# Patient Record
Sex: MALE | Race: WHITE | NOT HISPANIC OR LATINO | Employment: OTHER | URBAN - METROPOLITAN AREA
[De-identification: names, ages, dates, MRNs, and addresses within clinical notes are randomized per-mention and may not be internally consistent; named-entity substitution may affect disease eponyms.]

---

## 2023-03-25 DIAGNOSIS — E78.5 HYPERLIPIDEMIA, UNSPECIFIED: ICD-10-CM

## 2023-03-25 DIAGNOSIS — I10 ESSENTIAL (PRIMARY) HYPERTENSION: ICD-10-CM

## 2023-03-26 DIAGNOSIS — R60.0 LOCALIZED EDEMA: ICD-10-CM

## 2023-03-27 RX ORDER — HYDROCHLOROTHIAZIDE 12.5 MG/1
TABLET ORAL
Qty: 90 TABLET | Refills: 1 | Status: SHIPPED | OUTPATIENT
Start: 2023-03-27

## 2023-03-27 RX ORDER — ROSUVASTATIN CALCIUM 10 MG/1
TABLET, COATED ORAL
Qty: 90 TABLET | Refills: 1 | Status: SHIPPED | OUTPATIENT
Start: 2023-03-27

## 2023-03-27 RX ORDER — FUROSEMIDE 20 MG/1
TABLET ORAL
Qty: 45 TABLET | Refills: 0 | Status: SHIPPED | OUTPATIENT
Start: 2023-03-27 | End: 2023-06-27

## 2023-04-06 ENCOUNTER — TELEPHONE (OUTPATIENT)
Dept: PRIMARY CARE | Facility: CLINIC | Age: 78
End: 2023-04-06
Payer: MEDICARE

## 2023-04-07 DIAGNOSIS — I10 HYPERTENSION, UNSPECIFIED TYPE: Primary | ICD-10-CM

## 2023-04-07 RX ORDER — LOSARTAN POTASSIUM 100 MG/1
100 TABLET ORAL DAILY
Qty: 90 TABLET | Refills: 0 | Status: SHIPPED | OUTPATIENT
Start: 2023-04-07 | End: 2023-07-03

## 2023-04-07 RX ORDER — LOSARTAN POTASSIUM 100 MG/1
100 TABLET ORAL DAILY
COMMUNITY
End: 2023-04-07 | Stop reason: SDUPTHER

## 2023-04-08 DIAGNOSIS — E03.9 HYPOTHYROIDISM, UNSPECIFIED: ICD-10-CM

## 2023-04-11 RX ORDER — LEVOTHYROXINE SODIUM 137 UG/1
TABLET ORAL
Qty: 90 TABLET | Refills: 3 | Status: SHIPPED | OUTPATIENT
Start: 2023-04-11

## 2023-05-09 DIAGNOSIS — E87.6 HYPOKALEMIA: ICD-10-CM

## 2023-05-09 RX ORDER — POTASSIUM CHLORIDE 750 MG/1
TABLET, EXTENDED RELEASE ORAL
Qty: 90 TABLET | Refills: 1 | Status: SHIPPED | OUTPATIENT
Start: 2023-05-09

## 2023-05-13 DIAGNOSIS — Z00.00 ENCOUNTER FOR GENERAL ADULT MEDICAL EXAMINATION WITHOUT ABNORMAL FINDINGS: ICD-10-CM

## 2023-05-16 RX ORDER — EMPAGLIFLOZIN 10 MG/1
TABLET, FILM COATED ORAL
Qty: 30 TABLET | Refills: 3 | Status: SHIPPED | OUTPATIENT
Start: 2023-05-16

## 2023-05-30 LAB — PROSTATE SPECIFIC AG (NG/ML) IN SER/PLAS: 10.58 NG/ML (ref 0–4)

## 2023-06-14 ENCOUNTER — NURSING HOME VISIT (OUTPATIENT)
Dept: POST ACUTE CARE | Facility: EXTERNAL LOCATION | Age: 78
End: 2023-06-14
Payer: MEDICARE

## 2023-06-14 DIAGNOSIS — N18.31 STAGE 3A CHRONIC KIDNEY DISEASE (MULTI): ICD-10-CM

## 2023-06-14 DIAGNOSIS — I48.19 PERSISTENT ATRIAL FIBRILLATION (MULTI): Primary | ICD-10-CM

## 2023-06-14 DIAGNOSIS — J96.00 SEPSIS DUE TO STREPTOCOCCUS SPECIES WITH ACUTE RESPIRATORY FAILURE, UNSPECIFIED WHETHER HYPOXIA OR HYPERCAPNIA PRESENT, UNSPECIFIED WHETHER SEPTIC SHOCK PRESENT (MULTI): ICD-10-CM
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
## 2023-06-14 DIAGNOSIS — R65.20 SEPSIS DUE TO STREPTOCOCCUS SPECIES WITH ACUTE RESPIRATORY FAILURE, UNSPECIFIED WHETHER HYPOXIA OR HYPERCAPNIA PRESENT, UNSPECIFIED WHETHER SEPTIC SHOCK PRESENT (MULTI): ICD-10-CM

## 2023-06-14 DIAGNOSIS — R78.81 BACTEREMIA: ICD-10-CM

## 2023-06-14 DIAGNOSIS — I95.89 HYPOTENSION DUE TO HYPOVOLEMIA: ICD-10-CM

## 2023-06-14 DIAGNOSIS — A40.9 SEPSIS DUE TO STREPTOCOCCUS SPECIES WITH ACUTE RESPIRATORY FAILURE, UNSPECIFIED WHETHER HYPOXIA OR HYPERCAPNIA PRESENT, UNSPECIFIED WHETHER SEPTIC SHOCK PRESENT (MULTI): ICD-10-CM

## 2023-06-14 DIAGNOSIS — E86.1 HYPOTENSION DUE TO HYPOVOLEMIA: ICD-10-CM

## 2023-06-14 DIAGNOSIS — K92.1 GASTROINTESTINAL HEMORRHAGE WITH MELENA: ICD-10-CM

## 2023-06-14 DIAGNOSIS — I33.0 SUBACUTE BACTERIAL ENDOCARDITIS (HHS-HCC): ICD-10-CM

## 2023-06-14 PROCEDURE — 99306 1ST NF CARE HIGH MDM 50: CPT | Performed by: STUDENT IN AN ORGANIZED HEALTH CARE EDUCATION/TRAINING PROGRAM

## 2023-06-14 ASSESSMENT — ENCOUNTER SYMPTOMS
NEUROLOGICAL NEGATIVE: 1
GASTROINTESTINAL NEGATIVE: 1
PSYCHIATRIC NEGATIVE: 1
FATIGUE: 1
MUSCULOSKELETAL NEGATIVE: 1
APPETITE CHANGE: 1
CARDIOVASCULAR NEGATIVE: 1
SHORTNESS OF BREATH: 1

## 2023-06-14 NOTE — LETTER
Patient: Dustin Draper  : 1945    Encounter Date: 2023    Subjective  Patient ID: Dustin Draper Jr. is a 78 y.o. male.    Pt is a 78 year old amle with afib, HTN, CHF, CKD who presented to the [facility] ED with weakness and falls. Pt was fund to be bacteremic with group C strep and suspected endocarditis. Pt was seen by ID, no infectious source or vegetations were found however ID did recommend 4 weeks of antibiotics. Pt was then recommended SNF of discharge. ON evaluation pt reports no complaints however is weak. Regards no acute complaints at this time and feels ok. States no additional issues.         Review of Systems   Constitutional:  Positive for appetite change and fatigue.   HENT: Negative.     Respiratory:  Positive for shortness of breath.    Cardiovascular: Negative.    Gastrointestinal: Negative.    Musculoskeletal: Negative.    Skin: Negative.    Neurological: Negative.    Psychiatric/Behavioral: Negative.         ObjectiveVitals Reviewed via facility EMR   Physical Exam  Vitals and nursing note reviewed.   Constitutional:       General: He is not in acute distress.     Appearance: He is ill-appearing.      Comments: Elderly, lethargic   HENT:      Mouth/Throat:      Mouth: Mucous membranes are moist.      Pharynx: Oropharynx is clear. No oropharyngeal exudate.   Eyes:      Extraocular Movements: Extraocular movements intact.      Pupils: Pupils are equal, round, and reactive to light.   Cardiovascular:      Rate and Rhythm: Normal rate and regular rhythm.      Pulses: Normal pulses.      Heart sounds: Normal heart sounds. No murmur heard.     Comments: 3/6 systolic murmur  Pulmonary:      Effort: Pulmonary effort is normal. No respiratory distress.   Abdominal:      General: Abdomen is flat. Bowel sounds are normal.   Musculoskeletal:         General: Normal range of motion.      Right lower leg: No edema.      Left lower leg: No edema.   Skin:     General: Skin is warm and dry.       Capillary Refill: Capillary refill takes less than 2 seconds.   Neurological:      General: No focal deficit present.      Mental Status: He is alert. Mental status is at baseline.      Cranial Nerves: No cranial nerve deficit.      Motor: No weakness.   Psychiatric:         Mood and Affect: Mood normal.         Thought Content: Thought content normal.         Assessment/Plan  Diagnoses and all orders for this visit:  Persistent atrial fibrillation (CMS/HCC)  Stage 3a chronic kidney disease  Hypotension due to hypovolemia  Subacute bacterial endocarditis  Gastrointestinal hemorrhage with melena  Sepsis due to Streptococcus species with acute respiratory failure, unspecified whether hypoxia or hypercapnia present, unspecified whether septic shock present (CMS/HCC)  Fall, initial encounter  Bacteremia    PT seen and examined, hospital course reviewed, will repeat blood cultures and obtain labs. Check chest x-ray, attempt trial void. Pending clinical course may discuss palliative care with family, continue supportive care, PT/OT, weekly labs    >45 minutes spent in management of pt      Electronically Signed By: Ramesh Harmon DO   6/14/23  9:36 PM

## 2023-06-15 NOTE — PROGRESS NOTES
Subjective   Patient ID: Dustin Draper Jr. is a 78 y.o. male.    Pt is a 78 year old amle with afib, HTN, CHF, CKD who presented to the [facility] ED with weakness and falls. Pt was fund to be bacteremic with group C strep and suspected endocarditis. Pt was seen by ID, no infectious source or vegetations were found however ID did recommend 4 weeks of antibiotics. Pt was then recommended SNF of discharge. ON evaluation pt reports no complaints however is weak. Regards no acute complaints at this time and feels ok. States no additional issues.         Review of Systems   Constitutional:  Positive for appetite change and fatigue.   HENT: Negative.     Respiratory:  Positive for shortness of breath.    Cardiovascular: Negative.    Gastrointestinal: Negative.    Musculoskeletal: Negative.    Skin: Negative.    Neurological: Negative.    Psychiatric/Behavioral: Negative.         Objective Vitals Reviewed via facility EMR   Physical Exam  Vitals and nursing note reviewed.   Constitutional:       General: He is not in acute distress.     Appearance: He is ill-appearing.      Comments: Elderly, lethargic   HENT:      Mouth/Throat:      Mouth: Mucous membranes are moist.      Pharynx: Oropharynx is clear. No oropharyngeal exudate.   Eyes:      Extraocular Movements: Extraocular movements intact.      Pupils: Pupils are equal, round, and reactive to light.   Cardiovascular:      Rate and Rhythm: Normal rate and regular rhythm.      Pulses: Normal pulses.      Heart sounds: Normal heart sounds. No murmur heard.     Comments: 3/6 systolic murmur  Pulmonary:      Effort: Pulmonary effort is normal. No respiratory distress.   Abdominal:      General: Abdomen is flat. Bowel sounds are normal.   Musculoskeletal:         General: Normal range of motion.      Right lower leg: No edema.      Left lower leg: No edema.   Skin:     General: Skin is warm and dry.      Capillary Refill: Capillary refill takes less than 2 seconds.    Neurological:      General: No focal deficit present.      Mental Status: He is alert. Mental status is at baseline.      Cranial Nerves: No cranial nerve deficit.      Motor: No weakness.   Psychiatric:         Mood and Affect: Mood normal.         Thought Content: Thought content normal.         Assessment/Plan   Diagnoses and all orders for this visit:  Persistent atrial fibrillation (CMS/HCC)  Stage 3a chronic kidney disease  Hypotension due to hypovolemia  Subacute bacterial endocarditis  Gastrointestinal hemorrhage with melena  Sepsis due to Streptococcus species with acute respiratory failure, unspecified whether hypoxia or hypercapnia present, unspecified whether septic shock present (CMS/[facility])  Fall, initial encounter  Bacteremia    PT seen and examined, hospital course reviewed, will repeat blood cultures and obtain labs. Check chest x-ray, attempt trial void. Pending clinical course may discuss palliative care with family, continue supportive care, PT/OT, weekly labs    >45 minutes spent in management of pt

## 2023-06-16 ENCOUNTER — NURSING HOME VISIT (OUTPATIENT)
Dept: POST ACUTE CARE | Facility: EXTERNAL LOCATION | Age: 78
End: 2023-06-16
Payer: MEDICARE

## 2023-06-16 DIAGNOSIS — E86.1 HYPOTENSION DUE TO HYPOVOLEMIA: Primary | ICD-10-CM

## 2023-06-16 DIAGNOSIS — I33.0 SUBACUTE BACTERIAL ENDOCARDITIS (HHS-HCC): ICD-10-CM

## 2023-06-16 DIAGNOSIS — I48.19 PERSISTENT ATRIAL FIBRILLATION (MULTI): ICD-10-CM

## 2023-06-16 DIAGNOSIS — R78.81 BACTEREMIA: ICD-10-CM

## 2023-06-16 DIAGNOSIS — R65.20 SEPSIS DUE TO STREPTOCOCCUS SPECIES WITH ACUTE RESPIRATORY FAILURE, UNSPECIFIED WHETHER HYPOXIA OR HYPERCAPNIA PRESENT, UNSPECIFIED WHETHER SEPTIC SHOCK PRESENT (MULTI): ICD-10-CM

## 2023-06-16 DIAGNOSIS — J96.00 SEPSIS DUE TO STREPTOCOCCUS SPECIES WITH ACUTE RESPIRATORY FAILURE, UNSPECIFIED WHETHER HYPOXIA OR HYPERCAPNIA PRESENT, UNSPECIFIED WHETHER SEPTIC SHOCK PRESENT (MULTI): ICD-10-CM

## 2023-06-16 DIAGNOSIS — N18.31 STAGE 3A CHRONIC KIDNEY DISEASE (MULTI): ICD-10-CM

## 2023-06-16 DIAGNOSIS — I95.89 HYPOTENSION DUE TO HYPOVOLEMIA: Primary | ICD-10-CM

## 2023-06-16 DIAGNOSIS — A40.9 SEPSIS DUE TO STREPTOCOCCUS SPECIES WITH ACUTE RESPIRATORY FAILURE, UNSPECIFIED WHETHER HYPOXIA OR HYPERCAPNIA PRESENT, UNSPECIFIED WHETHER SEPTIC SHOCK PRESENT (MULTI): ICD-10-CM

## 2023-06-16 PROCEDURE — 99309 SBSQ NF CARE MODERATE MDM 30: CPT | Performed by: STUDENT IN AN ORGANIZED HEALTH CARE EDUCATION/TRAINING PROGRAM

## 2023-06-16 NOTE — LETTER
Patient: Dustin Draper  : 1945    Encounter Date: 2023    Subjective  Patient ID: Dustin Draper Jr. is a 78 y.o. male.      Pt seen resting in bed, reports overall doing well and some improvement but still feels weak. States his legs are swollen and have become edematous. Still having some shortness of breath. Cultures still pending, no additional issues.           Review of Systems   Constitutional: Negative.    HENT: Negative.     Respiratory: Negative.     Cardiovascular: Negative.    Gastrointestinal: Negative.    Musculoskeletal: Negative.    Skin: Negative.    Neurological: Negative.    Psychiatric/Behavioral: Negative.         ObjectiveVitals Reviewed via facility EMR   Physical Exam  Vitals and nursing note reviewed.   Constitutional:       General: He is not in acute distress.     Appearance: Normal appearance.   HENT:      Mouth/Throat:      Mouth: Mucous membranes are moist.      Pharynx: Oropharynx is clear. No oropharyngeal exudate.   Eyes:      Extraocular Movements: Extraocular movements intact.      Pupils: Pupils are equal, round, and reactive to light.   Cardiovascular:      Rate and Rhythm: Normal rate and regular rhythm.      Pulses: Normal pulses.      Heart sounds: Normal heart sounds. No murmur heard.  Pulmonary:      Effort: Pulmonary effort is normal. No respiratory distress.   Abdominal:      General: Abdomen is flat. Bowel sounds are normal. There is no distension.      Tenderness: There is no abdominal tenderness.   Musculoskeletal:         General: Normal range of motion.      Right lower leg: Edema present.      Left lower leg: Edema present.      Comments: +2 edmea bilaterally   Skin:     General: Skin is warm and dry.      Capillary Refill: Capillary refill takes less than 2 seconds.   Neurological:      General: No focal deficit present.      Mental Status: He is alert. Mental status is at baseline.      Cranial Nerves: No cranial nerve deficit.      Motor: No  weakness.   Psychiatric:         Mood and Affect: Mood normal.         Thought Content: Thought content normal.         Assessment/Plan  Diagnoses and all orders for this visit:  Hypotension due to hypovolemia  Persistent atrial fibrillation (CMS/HCC)  Subacute bacterial endocarditis  Stage 3a chronic kidney disease  Sepsis due to Streptococcus species with acute respiratory failure, unspecified whether hypoxia or hypercapnia present, unspecified whether septic shock present (CMS/HCC)  Bacteremia    Pt medically stable however still hypoxic and fluid overloaded, start increase of diurectics monitor weight, await blood cultures, continue antibiotics. Pt/Ot encourage out of bed with therapy. Bi-weekly labs, supportive care    >30 minutes spent in management of pt      Electronically Signed By: Ramesh Harmon DO   6/18/23  4:42 PM

## 2023-06-18 ASSESSMENT — ENCOUNTER SYMPTOMS
CONSTITUTIONAL NEGATIVE: 1
PSYCHIATRIC NEGATIVE: 1
RESPIRATORY NEGATIVE: 1
NEUROLOGICAL NEGATIVE: 1
MUSCULOSKELETAL NEGATIVE: 1
GASTROINTESTINAL NEGATIVE: 1
CARDIOVASCULAR NEGATIVE: 1

## 2023-06-18 NOTE — PROGRESS NOTES
Subjective   Patient ID: Dustin Draper Jr. is a 78 y.o. male.      Pt seen resting in bed, reports overall doing well and some improvement but still feels weak. States his legs are swollen and have become edematous. Still having some shortness of breath. Cultures still pending, no additional issues.           Review of Systems   Constitutional: Negative.    HENT: Negative.     Respiratory: Negative.     Cardiovascular: Negative.    Gastrointestinal: Negative.    Musculoskeletal: Negative.    Skin: Negative.    Neurological: Negative.    Psychiatric/Behavioral: Negative.         Objective Vitals Reviewed via facility EMR   Physical Exam  Vitals and nursing note reviewed.   Constitutional:       General: He is not in acute distress.     Appearance: Normal appearance.   HENT:      Mouth/Throat:      Mouth: Mucous membranes are moist.      Pharynx: Oropharynx is clear. No oropharyngeal exudate.   Eyes:      Extraocular Movements: Extraocular movements intact.      Pupils: Pupils are equal, round, and reactive to light.   Cardiovascular:      Rate and Rhythm: Normal rate and regular rhythm.      Pulses: Normal pulses.      Heart sounds: Normal heart sounds. No murmur heard.  Pulmonary:      Effort: Pulmonary effort is normal. No respiratory distress.   Abdominal:      General: Abdomen is flat. Bowel sounds are normal. There is no distension.      Tenderness: There is no abdominal tenderness.   Musculoskeletal:         General: Normal range of motion.      Right lower leg: Edema present.      Left lower leg: Edema present.      Comments: +2 edmea bilaterally   Skin:     General: Skin is warm and dry.      Capillary Refill: Capillary refill takes less than 2 seconds.   Neurological:      General: No focal deficit present.      Mental Status: He is alert. Mental status is at baseline.      Cranial Nerves: No cranial nerve deficit.      Motor: No weakness.   Psychiatric:         Mood and Affect: Mood normal.          Thought Content: Thought content normal.         Assessment/Plan   Diagnoses and all orders for this visit:  Hypotension due to hypovolemia  Persistent atrial fibrillation (CMS/HCC)  Subacute bacterial endocarditis  Stage 3a chronic kidney disease  Sepsis due to Streptococcus species with acute respiratory failure, unspecified whether hypoxia or hypercapnia present, unspecified whether septic shock present (CMS/HCC)  Bacteremia    Pt medically stable however still hypoxic and fluid overloaded, start increase of diurectics monitor weight, await blood cultures, continue antibiotics. Pt/Ot encourage out of bed with therapy. Bi-weekly labs, supportive care    >30 minutes spent in management of pt

## 2023-06-19 ENCOUNTER — APPOINTMENT (OUTPATIENT)
Dept: PRIMARY CARE | Facility: CLINIC | Age: 78
End: 2023-06-19
Payer: MEDICARE

## 2023-06-21 ENCOUNTER — NURSING HOME VISIT (OUTPATIENT)
Dept: POST ACUTE CARE | Facility: EXTERNAL LOCATION | Age: 78
End: 2023-06-21
Payer: MEDICARE

## 2023-06-21 DIAGNOSIS — J96.00 SEPSIS DUE TO STREPTOCOCCUS SPECIES WITH ACUTE RESPIRATORY FAILURE, UNSPECIFIED WHETHER HYPOXIA OR HYPERCAPNIA PRESENT, UNSPECIFIED WHETHER SEPTIC SHOCK PRESENT (MULTI): ICD-10-CM

## 2023-06-21 DIAGNOSIS — R65.20 SEPSIS DUE TO STREPTOCOCCUS SPECIES WITH ACUTE RESPIRATORY FAILURE, UNSPECIFIED WHETHER HYPOXIA OR HYPERCAPNIA PRESENT, UNSPECIFIED WHETHER SEPTIC SHOCK PRESENT (MULTI): ICD-10-CM

## 2023-06-21 DIAGNOSIS — R78.81 BACTEREMIA: ICD-10-CM

## 2023-06-21 DIAGNOSIS — I48.19 PERSISTENT ATRIAL FIBRILLATION (MULTI): ICD-10-CM

## 2023-06-21 DIAGNOSIS — I33.0 SUBACUTE BACTERIAL ENDOCARDITIS (HHS-HCC): ICD-10-CM

## 2023-06-21 DIAGNOSIS — N18.31 STAGE 3A CHRONIC KIDNEY DISEASE (MULTI): ICD-10-CM

## 2023-06-21 DIAGNOSIS — A40.9 SEPSIS DUE TO STREPTOCOCCUS SPECIES WITH ACUTE RESPIRATORY FAILURE, UNSPECIFIED WHETHER HYPOXIA OR HYPERCAPNIA PRESENT, UNSPECIFIED WHETHER SEPTIC SHOCK PRESENT (MULTI): ICD-10-CM

## 2023-06-21 DIAGNOSIS — E87.6 HYPOKALEMIA: Primary | ICD-10-CM

## 2023-06-21 PROCEDURE — 99308 SBSQ NF CARE LOW MDM 20: CPT | Performed by: STUDENT IN AN ORGANIZED HEALTH CARE EDUCATION/TRAINING PROGRAM

## 2023-06-21 NOTE — LETTER
Patient: Dustin Draper  : 1945    Encounter Date: 2023    Subjective  Patient ID: Dustin Draper Jr. is a 78 y.o. male.    Pt seen resting in bed. He reports he is doing much better however still feeling weak. Blood cultures have been negative. Regards no additional issues, working well with therapy.        Review of Systems   Constitutional: Negative.    HENT: Negative.     Respiratory: Negative.     Cardiovascular: Negative.    Gastrointestinal: Negative.    Musculoskeletal: Negative.    Neurological: Negative.    Psychiatric/Behavioral: Negative.         ObjectiveVitals Reviewed via facility EMR   Physical Exam  Vitals and nursing note reviewed.   Constitutional:       General: He is not in acute distress.     Appearance: Normal appearance.      Comments: Resides in bed   HENT:      Mouth/Throat:      Mouth: Mucous membranes are moist.      Pharynx: Oropharynx is clear. No oropharyngeal exudate.   Eyes:      Extraocular Movements: Extraocular movements intact.      Pupils: Pupils are equal, round, and reactive to light.   Cardiovascular:      Rate and Rhythm: Normal rate and regular rhythm.      Pulses: Normal pulses.      Heart sounds: Normal heart sounds. No murmur heard.  Pulmonary:      Effort: Pulmonary effort is normal. No respiratory distress.   Abdominal:      General: Abdomen is flat. Bowel sounds are normal. There is no distension.      Tenderness: There is no abdominal tenderness.   Musculoskeletal:         General: Normal range of motion.      Right lower leg: Edema present.      Left lower leg: Edema present.   Skin:     General: Skin is warm and dry.      Capillary Refill: Capillary refill takes less than 2 seconds.   Neurological:      General: No focal deficit present.      Mental Status: He is alert. Mental status is at baseline.      Cranial Nerves: No cranial nerve deficit.      Motor: No weakness.   Psychiatric:         Mood and Affect: Mood normal.         Thought Content:  Thought content normal.         Assessment/Plan  Diagnoses and all orders for this visit:  Hypokalemia  Bacteremia  Sepsis due to Streptococcus species with acute respiratory failure, unspecified whether hypoxia or hypercapnia present, unspecified whether septic shock present (CMS/[facility])  Stage 3a chronic kidney disease  Subacute bacterial endocarditis  Persistent atrial fibrillation (CMS/[facility])    Pt seen and examined, continue pt/ot, labs reviewed, noted to be hypokalemic, start aggressive potassium supplements,  continue abx, continue supportive care        Electronically Signed By: Ramesh Harmon DO   6/22/23  9:44 PM

## 2023-06-22 PROBLEM — E87.6 HYPOKALEMIA: Status: ACTIVE | Noted: 2023-06-22

## 2023-06-22 ASSESSMENT — ENCOUNTER SYMPTOMS
CARDIOVASCULAR NEGATIVE: 1
RESPIRATORY NEGATIVE: 1
GASTROINTESTINAL NEGATIVE: 1
CONSTITUTIONAL NEGATIVE: 1
NEUROLOGICAL NEGATIVE: 1
PSYCHIATRIC NEGATIVE: 1
MUSCULOSKELETAL NEGATIVE: 1

## 2023-06-23 ENCOUNTER — NURSING HOME VISIT (OUTPATIENT)
Dept: POST ACUTE CARE | Facility: EXTERNAL LOCATION | Age: 78
End: 2023-06-23
Payer: MEDICARE

## 2023-06-23 DIAGNOSIS — I48.19 PERSISTENT ATRIAL FIBRILLATION (MULTI): ICD-10-CM

## 2023-06-23 DIAGNOSIS — N18.31 STAGE 3A CHRONIC KIDNEY DISEASE (MULTI): ICD-10-CM

## 2023-06-23 DIAGNOSIS — R65.20 SEPSIS DUE TO STREPTOCOCCUS SPECIES WITH ACUTE RESPIRATORY FAILURE, UNSPECIFIED WHETHER HYPOXIA OR HYPERCAPNIA PRESENT, UNSPECIFIED WHETHER SEPTIC SHOCK PRESENT (MULTI): Primary | ICD-10-CM

## 2023-06-23 DIAGNOSIS — J96.00 SEPSIS DUE TO STREPTOCOCCUS SPECIES WITH ACUTE RESPIRATORY FAILURE, UNSPECIFIED WHETHER HYPOXIA OR HYPERCAPNIA PRESENT, UNSPECIFIED WHETHER SEPTIC SHOCK PRESENT (MULTI): Primary | ICD-10-CM

## 2023-06-23 DIAGNOSIS — E87.6 HYPOKALEMIA: ICD-10-CM

## 2023-06-23 DIAGNOSIS — K92.1 GASTROINTESTINAL HEMORRHAGE WITH MELENA: ICD-10-CM

## 2023-06-23 DIAGNOSIS — A40.9 SEPSIS DUE TO STREPTOCOCCUS SPECIES WITH ACUTE RESPIRATORY FAILURE, UNSPECIFIED WHETHER HYPOXIA OR HYPERCAPNIA PRESENT, UNSPECIFIED WHETHER SEPTIC SHOCK PRESENT (MULTI): Primary | ICD-10-CM

## 2023-06-23 PROCEDURE — 99308 SBSQ NF CARE LOW MDM 20: CPT | Performed by: STUDENT IN AN ORGANIZED HEALTH CARE EDUCATION/TRAINING PROGRAM

## 2023-06-23 NOTE — PROGRESS NOTES
Subjective   Patient ID: Dustin Draper Jr. is a 78 y.o. male.    Pt seen resting in bed. He reports he is doing much better however still feeling weak. Blood cultures have been negative. Regards no additional issues, working well with therapy.        Review of Systems   Constitutional: Negative.    HENT: Negative.     Respiratory: Negative.     Cardiovascular: Negative.    Gastrointestinal: Negative.    Musculoskeletal: Negative.    Neurological: Negative.    Psychiatric/Behavioral: Negative.         Objective Vitals Reviewed via facility EMR   Physical Exam  Vitals and nursing note reviewed.   Constitutional:       General: He is not in acute distress.     Appearance: Normal appearance.      Comments: Resides in bed   HENT:      Mouth/Throat:      Mouth: Mucous membranes are moist.      Pharynx: Oropharynx is clear. No oropharyngeal exudate.   Eyes:      Extraocular Movements: Extraocular movements intact.      Pupils: Pupils are equal, round, and reactive to light.   Cardiovascular:      Rate and Rhythm: Normal rate and regular rhythm.      Pulses: Normal pulses.      Heart sounds: Normal heart sounds. No murmur heard.  Pulmonary:      Effort: Pulmonary effort is normal. No respiratory distress.   Abdominal:      General: Abdomen is flat. Bowel sounds are normal. There is no distension.      Tenderness: There is no abdominal tenderness.   Musculoskeletal:         General: Normal range of motion.      Right lower leg: Edema present.      Left lower leg: Edema present.   Skin:     General: Skin is warm and dry.      Capillary Refill: Capillary refill takes less than 2 seconds.   Neurological:      General: No focal deficit present.      Mental Status: He is alert. Mental status is at baseline.      Cranial Nerves: No cranial nerve deficit.      Motor: No weakness.   Psychiatric:         Mood and Affect: Mood normal.         Thought Content: Thought content normal.         Assessment/Plan   Diagnoses and all orders  for this visit:  Hypokalemia  Bacteremia  Sepsis due to Streptococcus species with acute respiratory failure, unspecified whether hypoxia or hypercapnia present, unspecified whether septic shock present (CMS/[facility])  Stage 3a chronic kidney disease  Subacute bacterial endocarditis  Persistent atrial fibrillation (CMS/[facility])    Pt seen and examined, continue pt/ot, labs reviewed, noted to be hypokalemic, start aggressive potassium supplements,  continue abx, continue supportive care

## 2023-06-23 NOTE — LETTER
Patient: Dustin Draper  : 1945    Encounter Date: 2023    Subjective  Patient ID: Dustni Draper Jr. is a 78 y.o. male.    Pt seen and examined. Overall feels like he is improving. States that he was standing with therapy. Having difficulties with electrolytes. Mentation has improved. Regards no additional issues.        Review of Systems   Constitutional: Negative.    HENT: Negative.     Respiratory: Negative.     Cardiovascular: Negative.    Gastrointestinal: Negative.    Musculoskeletal: Negative.    Skin: Negative.    Neurological: Negative.    Psychiatric/Behavioral: Negative.         ObjectiveVitals Reviewed via facility EMR   Physical Exam  Vitals and nursing note reviewed.   Constitutional:       General: He is not in acute distress.     Appearance: Normal appearance.   HENT:      Mouth/Throat:      Mouth: Mucous membranes are moist.      Pharynx: Oropharynx is clear. No oropharyngeal exudate.   Eyes:      Extraocular Movements: Extraocular movements intact.      Pupils: Pupils are equal, round, and reactive to light.   Cardiovascular:      Rate and Rhythm: Normal rate and regular rhythm.      Pulses: Normal pulses.      Heart sounds: Normal heart sounds. No murmur heard.  Pulmonary:      Effort: Pulmonary effort is normal. No respiratory distress.   Abdominal:      General: Abdomen is flat. Bowel sounds are normal. There is no distension.      Tenderness: There is no abdominal tenderness.   Musculoskeletal:         General: Normal range of motion.      Right lower leg: Edema present.      Left lower leg: Edema present.   Skin:     General: Skin is warm and dry.      Capillary Refill: Capillary refill takes less than 2 seconds.   Neurological:      General: No focal deficit present.      Mental Status: He is alert. Mental status is at baseline.      Cranial Nerves: No cranial nerve deficit.      Motor: No weakness.   Psychiatric:         Mood and Affect: Mood normal.         Thought Content:  Thought content normal.         Assessment/Plan  Diagnoses and all orders for this visit:  Sepsis due to Streptococcus species with acute respiratory failure, unspecified whether hypoxia or hypercapnia present, unspecified whether septic shock present (CMS/[facility])  Stage 3a chronic kidney disease  Persistent atrial fibrillation (CMS/[facility])  Gastrointestinal hemorrhage with melena  Hypokalemia      Patient fully evaluated, start potassium supplementation, continue supportive care, PT/OT, weekly labs, blood cultures negative      Electronically Signed By: Ramesh Harmon DO   6/24/23  1:36 PM

## 2023-06-24 DIAGNOSIS — I10 ESSENTIAL (PRIMARY) HYPERTENSION: ICD-10-CM

## 2023-06-24 DIAGNOSIS — I48.19 PERSISTENT ATRIAL FIBRILLATION (MULTI): Primary | ICD-10-CM

## 2023-06-24 ASSESSMENT — ENCOUNTER SYMPTOMS
CARDIOVASCULAR NEGATIVE: 1
PSYCHIATRIC NEGATIVE: 1
CONSTITUTIONAL NEGATIVE: 1
RESPIRATORY NEGATIVE: 1
NEUROLOGICAL NEGATIVE: 1
GASTROINTESTINAL NEGATIVE: 1
MUSCULOSKELETAL NEGATIVE: 1

## 2023-06-24 NOTE — PROGRESS NOTES
Subjective   Patient ID: Dustin Draper Jr. is a 78 y.o. male.    Pt seen and examined. Overall feels like he is improving. States that he was standing with therapy. Having difficulties with electrolytes. Mentation has improved. Regards no additional issues.        Review of Systems   Constitutional: Negative.    HENT: Negative.     Respiratory: Negative.     Cardiovascular: Negative.    Gastrointestinal: Negative.    Musculoskeletal: Negative.    Skin: Negative.    Neurological: Negative.    Psychiatric/Behavioral: Negative.         Objective Vitals Reviewed via facility EMR   Physical Exam  Vitals and nursing note reviewed.   Constitutional:       General: He is not in acute distress.     Appearance: Normal appearance.   HENT:      Mouth/Throat:      Mouth: Mucous membranes are moist.      Pharynx: Oropharynx is clear. No oropharyngeal exudate.   Eyes:      Extraocular Movements: Extraocular movements intact.      Pupils: Pupils are equal, round, and reactive to light.   Cardiovascular:      Rate and Rhythm: Normal rate and regular rhythm.      Pulses: Normal pulses.      Heart sounds: Normal heart sounds. No murmur heard.  Pulmonary:      Effort: Pulmonary effort is normal. No respiratory distress.   Abdominal:      General: Abdomen is flat. Bowel sounds are normal. There is no distension.      Tenderness: There is no abdominal tenderness.   Musculoskeletal:         General: Normal range of motion.      Right lower leg: Edema present.      Left lower leg: Edema present.   Skin:     General: Skin is warm and dry.      Capillary Refill: Capillary refill takes less than 2 seconds.   Neurological:      General: No focal deficit present.      Mental Status: He is alert. Mental status is at baseline.      Cranial Nerves: No cranial nerve deficit.      Motor: No weakness.   Psychiatric:         Mood and Affect: Mood normal.         Thought Content: Thought content normal.         Assessment/Plan   Diagnoses and all  orders for this visit:  Sepsis due to Streptococcus species with acute respiratory failure, unspecified whether hypoxia or hypercapnia present, unspecified whether septic shock present (CMS/[facility])  Stage 3a chronic kidney disease  Persistent atrial fibrillation (CMS/[facility])  Gastrointestinal hemorrhage with melena  Hypokalemia      Patient fully evaluated, start potassium supplementation, continue supportive care, PT/OT, weekly labs, blood cultures negative

## 2023-06-26 ENCOUNTER — NURSING HOME VISIT (OUTPATIENT)
Dept: POST ACUTE CARE | Facility: EXTERNAL LOCATION | Age: 78
End: 2023-06-26
Payer: MEDICARE

## 2023-06-26 DIAGNOSIS — I48.19 PERSISTENT ATRIAL FIBRILLATION (MULTI): ICD-10-CM

## 2023-06-26 DIAGNOSIS — R33.9 URINARY RETENTION: Primary | ICD-10-CM

## 2023-06-26 DIAGNOSIS — N18.31 STAGE 3A CHRONIC KIDNEY DISEASE (MULTI): ICD-10-CM

## 2023-06-26 DIAGNOSIS — R78.81 BACTEREMIA: ICD-10-CM

## 2023-06-26 DIAGNOSIS — I33.0 SUBACUTE BACTERIAL ENDOCARDITIS (HHS-HCC): ICD-10-CM

## 2023-06-26 DIAGNOSIS — R60.0 LOCALIZED EDEMA: ICD-10-CM

## 2023-06-26 DIAGNOSIS — E87.6 HYPOKALEMIA: ICD-10-CM

## 2023-06-26 PROCEDURE — 99308 SBSQ NF CARE LOW MDM 20: CPT | Performed by: STUDENT IN AN ORGANIZED HEALTH CARE EDUCATION/TRAINING PROGRAM

## 2023-06-26 NOTE — LETTER
Patient: Dustin Draper  : 1945    Encounter Date: 2023    Subjective  Patient ID: Dustin Draper Jr. is a 78 y.o. male.    Pt seen and examined, overall doing well, reports feeling better. Potassium still low. Regards no additional issues, still has noriega in place.        Review of Systems   Constitutional: Negative.    HENT: Negative.     Respiratory: Negative.     Cardiovascular: Negative.    Gastrointestinal: Negative.    Musculoskeletal: Negative.    Skin: Negative.    Neurological: Negative.    Psychiatric/Behavioral: Negative.         ObjectiveVitals Reviewed via facility EMR   Physical Exam  Vitals and nursing note reviewed.   Constitutional:       General: He is not in acute distress.     Appearance: Normal appearance.   HENT:      Mouth/Throat:      Mouth: Mucous membranes are moist.      Pharynx: Oropharynx is clear. No oropharyngeal exudate.   Eyes:      Extraocular Movements: Extraocular movements intact.      Pupils: Pupils are equal, round, and reactive to light.   Cardiovascular:      Rate and Rhythm: Normal rate and regular rhythm.      Pulses: Normal pulses.      Heart sounds: Normal heart sounds. No murmur heard.  Pulmonary:      Effort: Pulmonary effort is normal. No respiratory distress.   Abdominal:      General: Abdomen is flat. Bowel sounds are normal. There is no distension.      Tenderness: There is no abdominal tenderness.   Genitourinary:     Comments: Noriega in place  Musculoskeletal:         General: Normal range of motion.      Right lower leg: No edema.      Left lower leg: No edema.   Skin:     General: Skin is warm and dry.      Capillary Refill: Capillary refill takes less than 2 seconds.   Neurological:      General: No focal deficit present.      Mental Status: He is alert. Mental status is at baseline.      Cranial Nerves: No cranial nerve deficit.      Motor: No weakness.   Psychiatric:         Mood and Affect: Mood normal.         Thought Content: Thought content  normal.         Assessment/Plan  Diagnoses and all orders for this visit:  Urinary retention  Subacute bacterial endocarditis  Stage 3a chronic kidney disease  Persistent atrial fibrillation (CMS/HCC)  Hypokalemia  Bacteremia  Patient overall improving medically, continue to optimize PT/OT, recommend removal of noriega due to infection risk. Pt/OT, poatassium support, weekly labs        Electronically Signed By: Ramesh Harmon DO   6/27/23  9:21 PM

## 2023-06-27 DIAGNOSIS — Z00.00 ENCOUNTER FOR GENERAL ADULT MEDICAL EXAMINATION WITHOUT ABNORMAL FINDINGS: ICD-10-CM

## 2023-06-27 PROBLEM — R33.9 URINARY RETENTION: Status: ACTIVE | Noted: 2023-06-27

## 2023-06-27 RX ORDER — FUROSEMIDE 20 MG/1
TABLET ORAL
Qty: 45 TABLET | Refills: 0 | Status: SHIPPED | OUTPATIENT
Start: 2023-06-27

## 2023-06-27 ASSESSMENT — ENCOUNTER SYMPTOMS
NEUROLOGICAL NEGATIVE: 1
CARDIOVASCULAR NEGATIVE: 1
GASTROINTESTINAL NEGATIVE: 1
MUSCULOSKELETAL NEGATIVE: 1
PSYCHIATRIC NEGATIVE: 1
CONSTITUTIONAL NEGATIVE: 1
RESPIRATORY NEGATIVE: 1

## 2023-06-28 ENCOUNTER — NURSING HOME VISIT (OUTPATIENT)
Dept: POST ACUTE CARE | Facility: EXTERNAL LOCATION | Age: 78
End: 2023-06-28
Payer: MEDICARE

## 2023-06-28 DIAGNOSIS — I33.0 SUBACUTE BACTERIAL ENDOCARDITIS (HHS-HCC): ICD-10-CM

## 2023-06-28 DIAGNOSIS — R65.20 SEPSIS DUE TO STREPTOCOCCUS SPECIES WITH ACUTE RESPIRATORY FAILURE, UNSPECIFIED WHETHER HYPOXIA OR HYPERCAPNIA PRESENT, UNSPECIFIED WHETHER SEPTIC SHOCK PRESENT (MULTI): ICD-10-CM

## 2023-06-28 DIAGNOSIS — J96.00 SEPSIS DUE TO STREPTOCOCCUS SPECIES WITH ACUTE RESPIRATORY FAILURE, UNSPECIFIED WHETHER HYPOXIA OR HYPERCAPNIA PRESENT, UNSPECIFIED WHETHER SEPTIC SHOCK PRESENT (MULTI): ICD-10-CM

## 2023-06-28 DIAGNOSIS — I48.19 PERSISTENT ATRIAL FIBRILLATION (MULTI): Primary | ICD-10-CM

## 2023-06-28 DIAGNOSIS — J96.21 ACUTE ON CHRONIC RESPIRATORY FAILURE WITH HYPOXIA (MULTI): ICD-10-CM

## 2023-06-28 DIAGNOSIS — A40.9 SEPSIS DUE TO STREPTOCOCCUS SPECIES WITH ACUTE RESPIRATORY FAILURE, UNSPECIFIED WHETHER HYPOXIA OR HYPERCAPNIA PRESENT, UNSPECIFIED WHETHER SEPTIC SHOCK PRESENT (MULTI): ICD-10-CM
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
## 2023-06-28 DIAGNOSIS — N18.31 STAGE 3A CHRONIC KIDNEY DISEASE (MULTI): ICD-10-CM

## 2023-06-28 PROCEDURE — 99309 SBSQ NF CARE MODERATE MDM 30: CPT | Performed by: STUDENT IN AN ORGANIZED HEALTH CARE EDUCATION/TRAINING PROGRAM

## 2023-06-28 ASSESSMENT — ENCOUNTER SYMPTOMS
CHEST TIGHTNESS: 0
CARDIOVASCULAR NEGATIVE: 1
MUSCULOSKELETAL NEGATIVE: 1
PSYCHIATRIC NEGATIVE: 1
CONSTITUTIONAL NEGATIVE: 1
GASTROINTESTINAL NEGATIVE: 1
NEUROLOGICAL NEGATIVE: 1
SHORTNESS OF BREATH: 1
CHOKING: 0
APNEA: 0

## 2023-06-28 NOTE — PROGRESS NOTES
Subjective   Patient ID: Dustin Draper Jr. is a 78 y.o. male.    Pt seen and examined, overall doing well, reports feeling better. Potassium still low. Regards no additional issues, still has noriega in place.        Review of Systems   Constitutional: Negative.    HENT: Negative.     Respiratory: Negative.     Cardiovascular: Negative.    Gastrointestinal: Negative.    Musculoskeletal: Negative.    Skin: Negative.    Neurological: Negative.    Psychiatric/Behavioral: Negative.         Objective Vitals Reviewed via facility EMR   Physical Exam  Vitals and nursing note reviewed.   Constitutional:       General: He is not in acute distress.     Appearance: Normal appearance.   HENT:      Mouth/Throat:      Mouth: Mucous membranes are moist.      Pharynx: Oropharynx is clear. No oropharyngeal exudate.   Eyes:      Extraocular Movements: Extraocular movements intact.      Pupils: Pupils are equal, round, and reactive to light.   Cardiovascular:      Rate and Rhythm: Normal rate and regular rhythm.      Pulses: Normal pulses.      Heart sounds: Normal heart sounds. No murmur heard.  Pulmonary:      Effort: Pulmonary effort is normal. No respiratory distress.   Abdominal:      General: Abdomen is flat. Bowel sounds are normal. There is no distension.      Tenderness: There is no abdominal tenderness.   Genitourinary:     Comments: Noriega in place  Musculoskeletal:         General: Normal range of motion.      Right lower leg: No edema.      Left lower leg: No edema.   Skin:     General: Skin is warm and dry.      Capillary Refill: Capillary refill takes less than 2 seconds.   Neurological:      General: No focal deficit present.      Mental Status: He is alert. Mental status is at baseline.      Cranial Nerves: No cranial nerve deficit.      Motor: No weakness.   Psychiatric:         Mood and Affect: Mood normal.         Thought Content: Thought content normal.         Assessment/Plan   Diagnoses and all orders for this  visit:  Urinary retention  Subacute bacterial endocarditis  Stage 3a chronic kidney disease  Persistent atrial fibrillation (CMS/HCC)  Hypokalemia  Bacteremia  Patient overall improving medically, continue to optimize PT/OT, recommend removal of noriega due to infection risk. Pt/OT, poatassium support, weekly labs

## 2023-06-28 NOTE — LETTER
Patient: Dustin Draper  : 1945    Encounter Date: 2023    Subjective  Patient ID: Dustin Draper Jr. is a 78 y.o. male.    Pt seen and examined at bedside. Recently had noriega removed and did not require reinsertion. Electrolytes overall improving. Later in day patient noted to be desaturation in the mid-80s despite o2 increases. No fevers or constitutional symptoms noted. No additional issues at this time.        Review of Systems   Constitutional: Negative.    HENT: Negative.     Respiratory:  Positive for shortness of breath. Negative for apnea, choking and chest tightness.    Cardiovascular: Negative.    Gastrointestinal: Negative.    Musculoskeletal: Negative.    Neurological: Negative.    Psychiatric/Behavioral: Negative.         ObjectiveVitals Reviewed via facility EMR   Physical Exam  Vitals and nursing note reviewed.   Constitutional:       General: He is not in acute distress.     Appearance: Normal appearance.   HENT:      Mouth/Throat:      Mouth: Mucous membranes are moist.      Pharynx: Oropharynx is clear. No oropharyngeal exudate.   Eyes:      Extraocular Movements: Extraocular movements intact.      Pupils: Pupils are equal, round, and reactive to light.   Cardiovascular:      Rate and Rhythm: Normal rate and regular rhythm.      Pulses: Normal pulses.      Heart sounds: Normal heart sounds. No murmur heard.  Pulmonary:      Effort: Pulmonary effort is normal. No respiratory distress.      Breath sounds: Rales present.      Comments: On O2, mild dec breath sounds  Abdominal:      General: Abdomen is flat. Bowel sounds are normal. There is no distension.      Tenderness: There is no abdominal tenderness.   Musculoskeletal:         General: Normal range of motion.      Right lower leg: No edema.      Left lower leg: No edema.   Skin:     General: Skin is warm and dry.      Capillary Refill: Capillary refill takes less than 2 seconds.   Neurological:      General: No focal deficit  present.      Mental Status: He is alert. Mental status is at baseline.      Cranial Nerves: No cranial nerve deficit.      Motor: No weakness.   Psychiatric:         Mood and Affect: Mood normal.         Thought Content: Thought content normal.         Assessment/Plan  Diagnoses and all orders for this visit:  Persistent atrial fibrillation (CMS/HCC)  Subacute bacterial endocarditis  Stage 3a chronic kidney disease  Sepsis due to Streptococcus species with acute respiratory failure, unspecified whether hypoxia or hypercapnia present, unspecified whether septic shock present (CMS/HCC)  Fall, initial encounter  Acute on chronic respiratory failure with hypoxia (CMS/HCC)    Pt seen and evaluated, was medically stable this am, however, noted decreased oxygen later in the day. Suspect immobility could also be contributing as patient is not getting out of bed often. Recommend chest x-ray and duonebs treatment and dose of Lasix. CBC, BNP and CMP. Recommend hospital evaluation if there is no improvement of symptoms of oxygen status, staff updated. Recommend mobilization and continued PT/Ot and compliance efforts from patient moving forward.     >30 mintues spent in management of pt      Electronically Signed By: Ramesh Harmon DO   6/28/23  9:27 PM

## 2023-06-29 NOTE — PROGRESS NOTES
Subjective   Patient ID: Dustin Draper Jr. is a 78 y.o. male.    Pt seen and examined at bedside. Recently had noriega removed and did not require reinsertion. Electrolytes overall improving. Later in day patient noted to be desaturation in the mid-80s despite o2 increases. No fevers or constitutional symptoms noted. No additional issues at this time.        Review of Systems   Constitutional: Negative.    HENT: Negative.     Respiratory:  Positive for shortness of breath. Negative for apnea, choking and chest tightness.    Cardiovascular: Negative.    Gastrointestinal: Negative.    Musculoskeletal: Negative.    Neurological: Negative.    Psychiatric/Behavioral: Negative.         Objective Vitals Reviewed via facility EMR   Physical Exam  Vitals and nursing note reviewed.   Constitutional:       General: He is not in acute distress.     Appearance: Normal appearance.   HENT:      Mouth/Throat:      Mouth: Mucous membranes are moist.      Pharynx: Oropharynx is clear. No oropharyngeal exudate.   Eyes:      Extraocular Movements: Extraocular movements intact.      Pupils: Pupils are equal, round, and reactive to light.   Cardiovascular:      Rate and Rhythm: Normal rate and regular rhythm.      Pulses: Normal pulses.      Heart sounds: Normal heart sounds. No murmur heard.  Pulmonary:      Effort: Pulmonary effort is normal. No respiratory distress.      Breath sounds: Rales present.      Comments: On O2, mild dec breath sounds  Abdominal:      General: Abdomen is flat. Bowel sounds are normal. There is no distension.      Tenderness: There is no abdominal tenderness.   Musculoskeletal:         General: Normal range of motion.      Right lower leg: No edema.      Left lower leg: No edema.   Skin:     General: Skin is warm and dry.      Capillary Refill: Capillary refill takes less than 2 seconds.   Neurological:      General: No focal deficit present.      Mental Status: He is alert. Mental status is at baseline.       Cranial Nerves: No cranial nerve deficit.      Motor: No weakness.   Psychiatric:         Mood and Affect: Mood normal.         Thought Content: Thought content normal.         Assessment/Plan   Diagnoses and all orders for this visit:  Persistent atrial fibrillation (CMS/HCC)  Subacute bacterial endocarditis  Stage 3a chronic kidney disease  Sepsis due to Streptococcus species with acute respiratory failure, unspecified whether hypoxia or hypercapnia present, unspecified whether septic shock present (CMS/HCC)  Fall, initial encounter  Acute on chronic respiratory failure with hypoxia (CMS/[facility])    Pt seen and evaluated, was medically stable this am, however, noted decreased oxygen later in the day. Suspect immobility could also be contributing as patient is not getting out of bed often. Recommend chest x-ray and duonebs treatment and dose of Lasix. CBC, BNP and CMP. Recommend hospital evaluation if there is no improvement of symptoms of oxygen status, staff updated. Recommend mobilization and continued PT/Ot and compliance efforts from patient moving forward.     >30 mintues spent in management of pt

## 2023-07-06 RX ORDER — SPIRONOLACTONE 25 MG/1
TABLET ORAL
Qty: 90 TABLET | Refills: 1 | OUTPATIENT
Start: 2023-07-06 | End: 2023-08-05

## 2023-07-12 DIAGNOSIS — I10 ESSENTIAL (PRIMARY) HYPERTENSION: ICD-10-CM

## 2023-07-12 RX ORDER — AMLODIPINE BESYLATE 10 MG/1
TABLET ORAL
Qty: 90 TABLET | Refills: 1 | Status: SHIPPED | OUTPATIENT
Start: 2023-07-12

## 2023-07-12 RX ORDER — METOPROLOL SUCCINATE 50 MG/1
TABLET, EXTENDED RELEASE ORAL
Qty: 90 TABLET | Refills: 1 | Status: SHIPPED | OUTPATIENT
Start: 2023-07-12

## 2023-07-14 ENCOUNTER — NURSING HOME VISIT (OUTPATIENT)
Dept: POST ACUTE CARE | Facility: EXTERNAL LOCATION | Age: 78
End: 2023-07-14
Payer: MEDICARE

## 2023-07-14 ENCOUNTER — NURSING HOME VISIT (OUTPATIENT)
Dept: POST ACUTE CARE | Facility: EXTERNAL LOCATION | Age: 78
End: 2023-07-14

## 2023-07-14 DIAGNOSIS — I26.99 OTHER PULMONARY EMBOLISM WITHOUT ACUTE COR PULMONALE, UNSPECIFIED CHRONICITY (MULTI): ICD-10-CM

## 2023-07-14 DIAGNOSIS — J96.00 SEPSIS DUE TO STREPTOCOCCUS SPECIES WITH ACUTE RESPIRATORY FAILURE, UNSPECIFIED WHETHER HYPOXIA OR HYPERCAPNIA PRESENT, UNSPECIFIED WHETHER SEPTIC SHOCK PRESENT (MULTI): ICD-10-CM

## 2023-07-14 DIAGNOSIS — J96.21 ACUTE ON CHRONIC RESPIRATORY FAILURE WITH HYPOXIA (MULTI): ICD-10-CM

## 2023-07-14 DIAGNOSIS — I48.19 PERSISTENT ATRIAL FIBRILLATION (MULTI): Primary | ICD-10-CM

## 2023-07-14 DIAGNOSIS — R33.9 URINARY RETENTION: ICD-10-CM

## 2023-07-14 DIAGNOSIS — A40.9 SEPSIS DUE TO STREPTOCOCCUS SPECIES WITH ACUTE RESPIRATORY FAILURE, UNSPECIFIED WHETHER HYPOXIA OR HYPERCAPNIA PRESENT, UNSPECIFIED WHETHER SEPTIC SHOCK PRESENT (MULTI): ICD-10-CM

## 2023-07-14 DIAGNOSIS — R78.81 BACTEREMIA: ICD-10-CM

## 2023-07-14 DIAGNOSIS — R65.20 SEPSIS DUE TO STREPTOCOCCUS SPECIES WITH ACUTE RESPIRATORY FAILURE, UNSPECIFIED WHETHER HYPOXIA OR HYPERCAPNIA PRESENT, UNSPECIFIED WHETHER SEPTIC SHOCK PRESENT (MULTI): ICD-10-CM

## 2023-07-14 DIAGNOSIS — N18.31 STAGE 3A CHRONIC KIDNEY DISEASE (MULTI): ICD-10-CM

## 2023-07-14 DIAGNOSIS — J96.11 CHRONIC RESPIRATORY FAILURE WITH HYPOXIA (MULTI): ICD-10-CM

## 2023-07-14 PROCEDURE — 99308 SBSQ NF CARE LOW MDM 20: CPT | Performed by: STUDENT IN AN ORGANIZED HEALTH CARE EDUCATION/TRAINING PROGRAM

## 2023-07-14 PROCEDURE — 99306 1ST NF CARE HIGH MDM 50: CPT | Performed by: STUDENT IN AN ORGANIZED HEALTH CARE EDUCATION/TRAINING PROGRAM

## 2023-07-14 NOTE — LETTER
Patient: Dustin Draper  : 1945    Encounter Date: 2023    Subjective  Patient ID: Dustin Draper is a 78 y.o. male.    Pt seen and examined in bed. Has not gotten out of bed since being admitted to facility. Reports minimal improvement with therapy but breathing overall stable. Regards no medical issues or concerns        Review of Systems   Constitutional: Negative.    HENT: Negative.     Respiratory: Negative.          On O2   Cardiovascular: Negative.    Gastrointestinal: Negative.    Musculoskeletal: Negative.    Skin: Negative.    Neurological: Negative.    Psychiatric/Behavioral: Negative.         ObjectiveVitals Reviewed via facility EMR   Physical Exam  Vitals and nursing note reviewed.   Constitutional:       General: He is not in acute distress.     Appearance: Normal appearance.   HENT:      Mouth/Throat:      Mouth: Mucous membranes are moist.      Pharynx: Oropharynx is clear. No oropharyngeal exudate.   Eyes:      Extraocular Movements: Extraocular movements intact.      Pupils: Pupils are equal, round, and reactive to light.   Cardiovascular:      Rate and Rhythm: Normal rate and regular rhythm.      Pulses: Normal pulses.      Heart sounds: Normal heart sounds. No murmur heard.  Pulmonary:      Effort: Pulmonary effort is normal. No respiratory distress.      Comments: On O2, chronic hypoxia  Abdominal:      General: Abdomen is flat. Bowel sounds are normal. There is no distension.      Tenderness: There is no abdominal tenderness.      Comments: obese   Musculoskeletal:         General: Normal range of motion.      Right lower leg: No edema.      Left lower leg: No edema.   Skin:     General: Skin is warm and dry.      Capillary Refill: Capillary refill takes less than 2 seconds.   Neurological:      General: No focal deficit present.      Mental Status: He is alert. Mental status is at baseline.      Cranial Nerves: No cranial nerve deficit.      Motor: No weakness.   Psychiatric:          Mood and Affect: Mood normal.         Thought Content: Thought content normal.         Assessment/Plan  Diagnoses and all orders for this visit:  Persistent atrial fibrillation (CMS/HCC)  Other pulmonary embolism without acute cor pulmonale, unspecified chronicity (CMS/HCC)  Stage 3a chronic kidney disease  Sepsis due to Streptococcus species with acute respiratory failure, unspecified whether hypoxia or hypercapnia present, unspecified whether septic shock present (CMS/HCC)  Acute on chronic respiratory failure with hypoxia (CMS/HCC)      Pt seen and examined overall medically stable, continue supportive care, encourage out of bed and working with PT/OT, routine labs      Electronically Signed By: Ramesh Harmon DO   7/20/23  8:50 AM

## 2023-07-14 NOTE — LETTER
Patient: Dustin Draper  : 1945    Encounter Date: 2023    Subjective  Patient ID: Dusitn Draper is a 78 y.o. male.    Pt is a 78 year old male with with afib, HTN, CHF, CKD, recent bacteremia who was recently admitted to SNF s/p extensive hospitalization for bacteremia. Patient was admitted to snf however did have some compliance issues with pt/ot as he was not out of bed often. Patient at facility had somewhat worsening hypoxia, that was refractory to diuresis. Due to worsening hypoxia, pt sent to ED for further eval. DVT scan was negative, however patient was positive for bilateral PE. Patient was admitted for further mangement. Did not require surgical intervention and was discharged to snf in stable condition. He states no additional issues or concerns at this time and overall feels well.         Review of Systems   Constitutional: Negative.    HENT: Negative.     Respiratory: Negative.     Cardiovascular: Negative.    Gastrointestinal: Negative.    Musculoskeletal: Negative.    Skin: Negative.    Neurological: Negative.    Psychiatric/Behavioral: Negative.         ObjectiveVitals Reviewed via facility EMR   Physical Exam  Vitals and nursing note reviewed.   Constitutional:       General: He is not in acute distress.     Appearance: Normal appearance.      Comments: bedbound   HENT:      Mouth/Throat:      Mouth: Mucous membranes are moist.      Pharynx: Oropharynx is clear. No oropharyngeal exudate.   Eyes:      Extraocular Movements: Extraocular movements intact.      Pupils: Pupils are equal, round, and reactive to light.   Cardiovascular:      Rate and Rhythm: Normal rate and regular rhythm.      Pulses: Normal pulses.      Heart sounds: Normal heart sounds. No murmur heard.  Pulmonary:      Effort: Pulmonary effort is normal. No respiratory distress.      Comments: On o2, overall stable  Abdominal:      General: Abdomen is flat. Bowel sounds are normal. There is no distension.       Tenderness: There is no abdominal tenderness.   Musculoskeletal:         General: Normal range of motion.      Right lower leg: No edema.      Left lower leg: No edema.   Skin:     General: Skin is warm and dry.      Capillary Refill: Capillary refill takes less than 2 seconds.   Neurological:      General: No focal deficit present.      Mental Status: He is alert. Mental status is at baseline.      Cranial Nerves: No cranial nerve deficit.      Motor: No weakness.   Psychiatric:         Mood and Affect: Mood normal.         Thought Content: Thought content normal.         Assessment/Plan  Diagnoses and all orders for this visit:  Persistent atrial fibrillation (CMS/HCC)  Stage 3a chronic kidney disease  Urinary retention  Bacteremia  Sepsis due to Streptococcus species with acute respiratory failure, unspecified whether hypoxia or hypercapnia present, unspecified whether septic shock present (CMS/HCC)  Acute on chronic respiratory failure with hypoxia (CMS/HCC)  Other pulmonary embolism without acute cor pulmonale, unspecified chronicity (CMS/HCC)      Pt seen and examined, overall medically stable. Breathing optimized, hospital course reviewed, encourage pt/ot, supportive care, weekly labs    >45 minutes spent in management of pt      Electronically Signed By: Ramesh Harmon DO   7/17/23  9:41 PM

## 2023-07-17 PROBLEM — I26.99 OTHER PULMONARY EMBOLISM WITHOUT ACUTE COR PULMONALE (MULTI): Status: ACTIVE | Noted: 2023-07-17

## 2023-07-17 ASSESSMENT — ENCOUNTER SYMPTOMS
GASTROINTESTINAL NEGATIVE: 1
CONSTITUTIONAL NEGATIVE: 1
MUSCULOSKELETAL NEGATIVE: 1
PSYCHIATRIC NEGATIVE: 1
CARDIOVASCULAR NEGATIVE: 1
RESPIRATORY NEGATIVE: 1
NEUROLOGICAL NEGATIVE: 1

## 2023-07-18 NOTE — PROGRESS NOTES
Subjective   Patient ID: Dustin Draper is a 78 y.o. male.    Pt is a 78 year old male with with afib, HTN, CHF, CKD, recent bacteremia who was recently admitted to SNF s/p extensive hospitalization for bacteremia. Patient was admitted to snf however did have some compliance issues with pt/ot as he was not out of bed often. Patient at facility had somewhat worsening hypoxia, that was refractory to diuresis. Due to worsening hypoxia, pt sent to ED for further eval. DVT scan was negative, however patient was positive for bilateral PE. Patient was admitted for further mangement. Did not require surgical intervention and was discharged to snf in stable condition. He states no additional issues or concerns at this time and overall feels well.         Review of Systems   Constitutional: Negative.    HENT: Negative.     Respiratory: Negative.     Cardiovascular: Negative.    Gastrointestinal: Negative.    Musculoskeletal: Negative.    Skin: Negative.    Neurological: Negative.    Psychiatric/Behavioral: Negative.         Objective Vitals Reviewed via facility EMR   Physical Exam  Vitals and nursing note reviewed.   Constitutional:       General: He is not in acute distress.     Appearance: Normal appearance.      Comments: bedbound   HENT:      Mouth/Throat:      Mouth: Mucous membranes are moist.      Pharynx: Oropharynx is clear. No oropharyngeal exudate.   Eyes:      Extraocular Movements: Extraocular movements intact.      Pupils: Pupils are equal, round, and reactive to light.   Cardiovascular:      Rate and Rhythm: Normal rate and regular rhythm.      Pulses: Normal pulses.      Heart sounds: Normal heart sounds. No murmur heard.  Pulmonary:      Effort: Pulmonary effort is normal. No respiratory distress.      Comments: On o2, overall stable  Abdominal:      General: Abdomen is flat. Bowel sounds are normal. There is no distension.      Tenderness: There is no abdominal tenderness.   Musculoskeletal:          General: Normal range of motion.      Right lower leg: No edema.      Left lower leg: No edema.   Skin:     General: Skin is warm and dry.      Capillary Refill: Capillary refill takes less than 2 seconds.   Neurological:      General: No focal deficit present.      Mental Status: He is alert. Mental status is at baseline.      Cranial Nerves: No cranial nerve deficit.      Motor: No weakness.   Psychiatric:         Mood and Affect: Mood normal.         Thought Content: Thought content normal.         Assessment/Plan   Diagnoses and all orders for this visit:  Persistent atrial fibrillation (CMS/HCC)  Stage 3a chronic kidney disease  Urinary retention  Bacteremia  Sepsis due to Streptococcus species with acute respiratory failure, unspecified whether hypoxia or hypercapnia present, unspecified whether septic shock present (CMS/HCC)  Acute on chronic respiratory failure with hypoxia (CMS/HCC)  Other pulmonary embolism without acute cor pulmonale, unspecified chronicity (CMS/HCC)      Pt seen and examined, overall medically stable. Breathing optimized, hospital course reviewed, encourage pt/ot, supportive care, weekly labs    >45 minutes spent in management of pt

## 2023-07-19 ENCOUNTER — NURSING HOME VISIT (OUTPATIENT)
Dept: POST ACUTE CARE | Facility: EXTERNAL LOCATION | Age: 78
End: 2023-07-19
Payer: MEDICARE

## 2023-07-19 DIAGNOSIS — N18.31 STAGE 3A CHRONIC KIDNEY DISEASE (MULTI): ICD-10-CM

## 2023-07-19 DIAGNOSIS — J96.21 ACUTE ON CHRONIC RESPIRATORY FAILURE WITH HYPOXIA (MULTI): ICD-10-CM

## 2023-07-19 DIAGNOSIS — I26.99 OTHER PULMONARY EMBOLISM WITHOUT ACUTE COR PULMONALE, UNSPECIFIED CHRONICITY (MULTI): Primary | ICD-10-CM

## 2023-07-19 DIAGNOSIS — E87.6 HYPOKALEMIA: ICD-10-CM

## 2023-07-19 DIAGNOSIS — J96.11 CHRONIC RESPIRATORY FAILURE WITH HYPOXIA (MULTI): ICD-10-CM

## 2023-07-19 PROCEDURE — 99308 SBSQ NF CARE LOW MDM 20: CPT | Performed by: STUDENT IN AN ORGANIZED HEALTH CARE EDUCATION/TRAINING PROGRAM

## 2023-07-19 NOTE — LETTER
Patient: Dustin Draper  : 1945    Encounter Date: 2023    Subjective  Patient ID: Dustin Draper is a 78 y.o. male.    Pt sen and examined, no issues from respiratory standpoint, states overall is doing well without any issues. Slowly working with therapy however has been fairly non compliant with getting out of bed. Oxygen levels have been stable. No additional issues.         Review of Systems   Constitutional: Negative.    HENT: Negative.     Respiratory: Negative.     Cardiovascular: Negative.    Gastrointestinal: Negative.    Musculoskeletal: Negative.    Skin: Negative.    Neurological: Negative.    Psychiatric/Behavioral: Negative.         Objective  Vitals Reviewed via facility EMR   Physical Exam  Vitals and nursing note reviewed.   Constitutional:       General: He is not in acute distress.     Appearance: Normal appearance.   HENT:      Mouth/Throat:      Mouth: Mucous membranes are moist.      Pharynx: Oropharynx is clear. No oropharyngeal exudate.   Eyes:      Extraocular Movements: Extraocular movements intact.      Pupils: Pupils are equal, round, and reactive to light.   Cardiovascular:      Rate and Rhythm: Normal rate and regular rhythm.      Pulses: Normal pulses.      Heart sounds: Normal heart sounds. No murmur heard.  Pulmonary:      Effort: Pulmonary effort is normal. No respiratory distress.      Comments: On O2  Abdominal:      General: Abdomen is flat. Bowel sounds are normal. There is no distension.      Tenderness: There is no abdominal tenderness.   Musculoskeletal:         General: Normal range of motion.      Right lower leg: No edema.      Left lower leg: No edema.   Skin:     General: Skin is warm and dry.      Capillary Refill: Capillary refill takes less than 2 seconds.   Neurological:      General: No focal deficit present.      Mental Status: He is alert. Mental status is at baseline.      Cranial Nerves: No cranial nerve deficit.      Motor: No weakness.    Psychiatric:         Mood and Affect: Mood normal.         Thought Content: Thought content normal.         Assessment/Plan  Diagnoses and all orders for this visit:  Other pulmonary embolism without acute cor pulmonale, unspecified chronicity (CMS/HCC)  Stage 3a chronic kidney disease  Hypokalemia  Acute on chronic respiratory failure with hypoxia (CMS/HCC)  Chronic respiratory failure with hypoxia (CMS/HCC)      Pt seen and examined, continue supportive care, encourage OOB, continue current meds, routine labs.      Electronically Signed By: Ramesh Harmon DO   7/20/23  9:15 PM

## 2023-07-20 PROBLEM — J96.11 CHRONIC RESPIRATORY FAILURE WITH HYPOXIA (MULTI): Status: ACTIVE | Noted: 2023-07-20

## 2023-07-20 ASSESSMENT — ENCOUNTER SYMPTOMS
GASTROINTESTINAL NEGATIVE: 1
MUSCULOSKELETAL NEGATIVE: 1
PSYCHIATRIC NEGATIVE: 1
CONSTITUTIONAL NEGATIVE: 1
NEUROLOGICAL NEGATIVE: 1
CARDIOVASCULAR NEGATIVE: 1
CONSTITUTIONAL NEGATIVE: 1
PSYCHIATRIC NEGATIVE: 1
CARDIOVASCULAR NEGATIVE: 1
NEUROLOGICAL NEGATIVE: 1
GASTROINTESTINAL NEGATIVE: 1
MUSCULOSKELETAL NEGATIVE: 1
RESPIRATORY NEGATIVE: 1
RESPIRATORY NEGATIVE: 1

## 2023-07-20 NOTE — PROGRESS NOTES
Subjective   Patient ID: Dustin Draper is a 78 y.o. male.    Pt seen and examined in bed. Has not gotten out of bed since being admitted to facility. Reports minimal improvement with therapy but breathing overall stable. Regards no medical issues or concerns        Review of Systems   Constitutional: Negative.    HENT: Negative.     Respiratory: Negative.          On O2   Cardiovascular: Negative.    Gastrointestinal: Negative.    Musculoskeletal: Negative.    Skin: Negative.    Neurological: Negative.    Psychiatric/Behavioral: Negative.         Objective Vitals Reviewed via facility EMR   Physical Exam  Vitals and nursing note reviewed.   Constitutional:       General: He is not in acute distress.     Appearance: Normal appearance.   HENT:      Mouth/Throat:      Mouth: Mucous membranes are moist.      Pharynx: Oropharynx is clear. No oropharyngeal exudate.   Eyes:      Extraocular Movements: Extraocular movements intact.      Pupils: Pupils are equal, round, and reactive to light.   Cardiovascular:      Rate and Rhythm: Normal rate and regular rhythm.      Pulses: Normal pulses.      Heart sounds: Normal heart sounds. No murmur heard.  Pulmonary:      Effort: Pulmonary effort is normal. No respiratory distress.      Comments: On O2, chronic hypoxia  Abdominal:      General: Abdomen is flat. Bowel sounds are normal. There is no distension.      Tenderness: There is no abdominal tenderness.      Comments: obese   Musculoskeletal:         General: Normal range of motion.      Right lower leg: No edema.      Left lower leg: No edema.   Skin:     General: Skin is warm and dry.      Capillary Refill: Capillary refill takes less than 2 seconds.   Neurological:      General: No focal deficit present.      Mental Status: He is alert. Mental status is at baseline.      Cranial Nerves: No cranial nerve deficit.      Motor: No weakness.   Psychiatric:         Mood and Affect: Mood normal.         Thought Content: Thought  content normal.         Assessment/Plan   Diagnoses and all orders for this visit:  Persistent atrial fibrillation (CMS/[facility])  Other pulmonary embolism without acute cor pulmonale, unspecified chronicity (CMS/[facility])  Stage 3a chronic kidney disease  Sepsis due to Streptococcus species with acute respiratory failure, unspecified whether hypoxia or hypercapnia present, unspecified whether septic shock present (CMS/[facility])  Acute on chronic respiratory failure with hypoxia (CMS/[facility])      Pt seen and examined overall medically stable, continue supportive care, encourage out of bed and working with PT/OT, routine labs

## 2023-07-21 NOTE — PROGRESS NOTES
Subjective   Patient ID: Dustin Draper is a 78 y.o. male.    Pt sen and examined, no issues from respiratory standpoint, states overall is doing well without any issues. Slowly working with therapy however has been fairly non compliant with getting out of bed. Oxygen levels have been stable. No additional issues.         Review of Systems   Constitutional: Negative.    HENT: Negative.     Respiratory: Negative.     Cardiovascular: Negative.    Gastrointestinal: Negative.    Musculoskeletal: Negative.    Skin: Negative.    Neurological: Negative.    Psychiatric/Behavioral: Negative.         Objective   Vitals Reviewed via facility EMR   Physical Exam  Vitals and nursing note reviewed.   Constitutional:       General: He is not in acute distress.     Appearance: Normal appearance.   HENT:      Mouth/Throat:      Mouth: Mucous membranes are moist.      Pharynx: Oropharynx is clear. No oropharyngeal exudate.   Eyes:      Extraocular Movements: Extraocular movements intact.      Pupils: Pupils are equal, round, and reactive to light.   Cardiovascular:      Rate and Rhythm: Normal rate and regular rhythm.      Pulses: Normal pulses.      Heart sounds: Normal heart sounds. No murmur heard.  Pulmonary:      Effort: Pulmonary effort is normal. No respiratory distress.      Comments: On O2  Abdominal:      General: Abdomen is flat. Bowel sounds are normal. There is no distension.      Tenderness: There is no abdominal tenderness.   Musculoskeletal:         General: Normal range of motion.      Right lower leg: No edema.      Left lower leg: No edema.   Skin:     General: Skin is warm and dry.      Capillary Refill: Capillary refill takes less than 2 seconds.   Neurological:      General: No focal deficit present.      Mental Status: He is alert. Mental status is at baseline.      Cranial Nerves: No cranial nerve deficit.      Motor: No weakness.   Psychiatric:         Mood and Affect: Mood normal.         Thought Content:  Thought content normal.         Assessment/Plan   Diagnoses and all orders for this visit:  Other pulmonary embolism without acute cor pulmonale, unspecified chronicity (CMS/HCC)  Stage 3a chronic kidney disease  Hypokalemia  Acute on chronic respiratory failure with hypoxia (CMS/HCC)  Chronic respiratory failure with hypoxia (CMS/HCC)      Pt seen and examined, continue supportive care, encourage OOB, continue current meds, routine labs.

## 2023-07-26 ENCOUNTER — NURSING HOME VISIT (OUTPATIENT)
Dept: POST ACUTE CARE | Facility: EXTERNAL LOCATION | Age: 78
End: 2023-07-26
Payer: MEDICARE

## 2023-07-26 DIAGNOSIS — N18.31 STAGE 3A CHRONIC KIDNEY DISEASE (MULTI): ICD-10-CM

## 2023-07-26 DIAGNOSIS — J96.11 CHRONIC RESPIRATORY FAILURE WITH HYPOXIA (MULTI): ICD-10-CM
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
## 2023-07-26 DIAGNOSIS — J96.21 ACUTE ON CHRONIC RESPIRATORY FAILURE WITH HYPOXIA (MULTI): ICD-10-CM

## 2023-07-26 DIAGNOSIS — I48.19 PERSISTENT ATRIAL FIBRILLATION (MULTI): Primary | ICD-10-CM

## 2023-07-26 DIAGNOSIS — I26.99 OTHER PULMONARY EMBOLISM WITHOUT ACUTE COR PULMONALE, UNSPECIFIED CHRONICITY (MULTI): ICD-10-CM

## 2023-07-26 PROCEDURE — 99308 SBSQ NF CARE LOW MDM 20: CPT | Performed by: STUDENT IN AN ORGANIZED HEALTH CARE EDUCATION/TRAINING PROGRAM

## 2023-07-26 ASSESSMENT — ENCOUNTER SYMPTOMS
GASTROINTESTINAL NEGATIVE: 1
PSYCHIATRIC NEGATIVE: 1
CARDIOVASCULAR NEGATIVE: 1
CONSTITUTIONAL NEGATIVE: 1
MUSCULOSKELETAL NEGATIVE: 1
NEUROLOGICAL NEGATIVE: 1
RESPIRATORY NEGATIVE: 1

## 2023-07-26 NOTE — LETTER
Patient: Dustin Draper  : 1945    Encounter Date: 2023    Subjective  Patient ID: Dustin Draper is a 78 y.o. male.    Pt seen and examined, states overall doing well without any complaints. Is getting up in chair and feels like his strength is improving. Oxygen is overall stable. He regards no additional issues or concerns at this time.        Review of Systems   Constitutional: Negative.    HENT: Negative.     Respiratory: Negative.     Cardiovascular: Negative.    Gastrointestinal: Negative.    Musculoskeletal: Negative.    Skin: Negative.    Neurological: Negative.    Psychiatric/Behavioral: Negative.         ObjectiveVitals Reviewed via facility EMR   Physical Exam  Vitals and nursing note reviewed.   Constitutional:       General: He is not in acute distress.     Appearance: Normal appearance.   HENT:      Mouth/Throat:      Mouth: Mucous membranes are moist.      Pharynx: Oropharynx is clear. No oropharyngeal exudate.   Eyes:      Extraocular Movements: Extraocular movements intact.      Pupils: Pupils are equal, round, and reactive to light.   Cardiovascular:      Rate and Rhythm: Normal rate and regular rhythm.      Pulses: Normal pulses.      Heart sounds: Normal heart sounds. No murmur heard.  Pulmonary:      Effort: Pulmonary effort is normal. No respiratory distress.      Comments: On chronic O2  Abdominal:      General: Abdomen is flat. Bowel sounds are normal. There is no distension.      Tenderness: There is no abdominal tenderness.      Comments: obese   Musculoskeletal:         General: Normal range of motion.      Right lower leg: No edema.      Left lower leg: No edema.   Skin:     General: Skin is warm and dry.      Capillary Refill: Capillary refill takes less than 2 seconds.   Neurological:      General: No focal deficit present.      Mental Status: He is alert. Mental status is at baseline.      Cranial Nerves: No cranial nerve deficit.      Motor: No weakness.   Psychiatric:          Mood and Affect: Mood normal.         Thought Content: Thought content normal.         Assessment/Plan  Diagnoses and all orders for this visit:  Persistent atrial fibrillation (CMS/HCC)  Other pulmonary embolism without acute cor pulmonale, unspecified chronicity (CMS/HCC)  Stage 3a chronic kidney disease  Fall, initial encounter  Acute on chronic respiratory failure with hypoxia (CMS/HCC)  Chronic respiratory failure with hypoxia (CMS/HCC)    Pt seen and examined, overall feeling well, labs overall stable, continue supportive care, pt/ot, routine labs, no changes to medications        Electronically Signed By: Ramesh Harmon DO   7/26/23 10:47 PM

## 2023-07-27 ENCOUNTER — APPOINTMENT (OUTPATIENT)
Dept: PRIMARY CARE | Facility: CLINIC | Age: 78
End: 2023-07-27
Payer: MEDICARE

## 2023-08-02 ENCOUNTER — NURSING HOME VISIT (OUTPATIENT)
Dept: POST ACUTE CARE | Facility: EXTERNAL LOCATION | Age: 78
End: 2023-08-02
Payer: MEDICARE

## 2023-08-02 DIAGNOSIS — A40.9 SEPSIS DUE TO STREPTOCOCCUS SPECIES WITH ACUTE RESPIRATORY FAILURE, UNSPECIFIED WHETHER HYPOXIA OR HYPERCAPNIA PRESENT, UNSPECIFIED WHETHER SEPTIC SHOCK PRESENT (MULTI): ICD-10-CM

## 2023-08-02 DIAGNOSIS — I48.19 PERSISTENT ATRIAL FIBRILLATION (MULTI): Primary | ICD-10-CM

## 2023-08-02 DIAGNOSIS — N18.31 STAGE 3A CHRONIC KIDNEY DISEASE (MULTI): ICD-10-CM

## 2023-08-02 DIAGNOSIS — J96.21 ACUTE ON CHRONIC RESPIRATORY FAILURE WITH HYPOXIA (MULTI): ICD-10-CM

## 2023-08-02 DIAGNOSIS — J96.00 SEPSIS DUE TO STREPTOCOCCUS SPECIES WITH ACUTE RESPIRATORY FAILURE, UNSPECIFIED WHETHER HYPOXIA OR HYPERCAPNIA PRESENT, UNSPECIFIED WHETHER SEPTIC SHOCK PRESENT (MULTI): ICD-10-CM

## 2023-08-02 DIAGNOSIS — I26.99 OTHER PULMONARY EMBOLISM WITHOUT ACUTE COR PULMONALE, UNSPECIFIED CHRONICITY (MULTI): ICD-10-CM

## 2023-08-02 DIAGNOSIS — J96.11 CHRONIC RESPIRATORY FAILURE WITH HYPOXIA (MULTI): ICD-10-CM

## 2023-08-02 DIAGNOSIS — R65.20 SEPSIS DUE TO STREPTOCOCCUS SPECIES WITH ACUTE RESPIRATORY FAILURE, UNSPECIFIED WHETHER HYPOXIA OR HYPERCAPNIA PRESENT, UNSPECIFIED WHETHER SEPTIC SHOCK PRESENT (MULTI): ICD-10-CM

## 2023-08-02 PROCEDURE — 99309 SBSQ NF CARE MODERATE MDM 30: CPT | Performed by: STUDENT IN AN ORGANIZED HEALTH CARE EDUCATION/TRAINING PROGRAM

## 2023-08-02 NOTE — LETTER
Patient: Dustin Draper  : 1945    Encounter Date: 2023    Subjective  Patient ID: Dustin Draper is a 78 y.o. male.    Pt seen and examined at bedside. States that he is still benefitting from therapy but still is very weak. Regards that he could potentially be discharged shortly. He still does not feel ready for discharge as he is still having difficulty walking. States otherwise overall medically stable.        Review of Systems   Constitutional: Negative.    HENT: Negative.     Respiratory: Negative.     Cardiovascular: Negative.    Gastrointestinal: Negative.    Musculoskeletal: Negative.    Skin: Negative.    Neurological: Negative.    Psychiatric/Behavioral: Negative.         ObjectiveVitals Reviewed via facility EMR   Physical Exam  Vitals and nursing note reviewed.   Constitutional:       General: He is not in acute distress.     Appearance: Normal appearance.      Comments: bedbound   HENT:      Mouth/Throat:      Mouth: Mucous membranes are moist.      Pharynx: Oropharynx is clear. No oropharyngeal exudate.   Eyes:      Extraocular Movements: Extraocular movements intact.      Pupils: Pupils are equal, round, and reactive to light.   Cardiovascular:      Rate and Rhythm: Normal rate and regular rhythm.      Pulses: Normal pulses.      Heart sounds: Normal heart sounds. No murmur heard.  Pulmonary:      Effort: Pulmonary effort is normal. No respiratory distress.      Comments: On chronic oxygen  Abdominal:      General: Abdomen is flat. Bowel sounds are normal. There is no distension.      Tenderness: There is no abdominal tenderness.   Musculoskeletal:         General: Normal range of motion.      Right lower leg: No edema.      Left lower leg: No edema.   Skin:     General: Skin is warm and dry.      Capillary Refill: Capillary refill takes less than 2 seconds.   Neurological:      General: No focal deficit present.      Mental Status: He is alert. Mental status is at baseline.       Cranial Nerves: No cranial nerve deficit.      Motor: No weakness.   Psychiatric:         Mood and Affect: Mood normal.         Thought Content: Thought content normal.         Assessment/Plan  Diagnoses and all orders for this visit:  Persistent atrial fibrillation (CMS/HCC)  Other pulmonary embolism without acute cor pulmonale, unspecified chronicity (CMS/HCC)  Stage 3a chronic kidney disease (CMS/HCC)  Sepsis due to Streptococcus species with acute respiratory failure, unspecified whether hypoxia or hypercapnia present, unspecified whether septic shock present (CMS/HCC)  Chronic respiratory failure with hypoxia (CMS/HCC)  Acute on chronic respiratory failure with hypoxia (CMS/HCC)        Pt seen and examined. Continues to benefit from therapy, if patient is discharged to [facility] there is a high chance that patient will be readmitted to hospital. Recommend further therapy in a skilled facility so patient can maximize independence as he is slowly improving with PT/OT. Continue supportive care, routine labs.    >30 minutes spent in management of pt      Electronically Signed By: Ramesh Harmon DO   8/3/23  9:48 PM

## 2023-08-03 ASSESSMENT — ENCOUNTER SYMPTOMS
RESPIRATORY NEGATIVE: 1
PSYCHIATRIC NEGATIVE: 1
GASTROINTESTINAL NEGATIVE: 1
NEUROLOGICAL NEGATIVE: 1
CARDIOVASCULAR NEGATIVE: 1
CONSTITUTIONAL NEGATIVE: 1
MUSCULOSKELETAL NEGATIVE: 1

## 2023-08-04 NOTE — PROGRESS NOTES
Subjective   Patient ID: Dustin Draper is a 78 y.o. male.    Pt seen and examined at bedside. States that he is still benefitting from therapy but still is very weak. Regards that he could potentially be discharged shortly. He still does not feel ready for discharge as he is still having difficulty walking. States otherwise overall medically stable.        Review of Systems   Constitutional: Negative.    HENT: Negative.     Respiratory: Negative.     Cardiovascular: Negative.    Gastrointestinal: Negative.    Musculoskeletal: Negative.    Skin: Negative.    Neurological: Negative.    Psychiatric/Behavioral: Negative.         Objective Vitals Reviewed via facility EMR   Physical Exam  Vitals and nursing note reviewed.   Constitutional:       General: He is not in acute distress.     Appearance: Normal appearance.      Comments: bedbound   HENT:      Mouth/Throat:      Mouth: Mucous membranes are moist.      Pharynx: Oropharynx is clear. No oropharyngeal exudate.   Eyes:      Extraocular Movements: Extraocular movements intact.      Pupils: Pupils are equal, round, and reactive to light.   Cardiovascular:      Rate and Rhythm: Normal rate and regular rhythm.      Pulses: Normal pulses.      Heart sounds: Normal heart sounds. No murmur heard.  Pulmonary:      Effort: Pulmonary effort is normal. No respiratory distress.      Comments: On chronic oxygen  Abdominal:      General: Abdomen is flat. Bowel sounds are normal. There is no distension.      Tenderness: There is no abdominal tenderness.   Musculoskeletal:         General: Normal range of motion.      Right lower leg: No edema.      Left lower leg: No edema.   Skin:     General: Skin is warm and dry.      Capillary Refill: Capillary refill takes less than 2 seconds.   Neurological:      General: No focal deficit present.      Mental Status: He is alert. Mental status is at baseline.      Cranial Nerves: No cranial nerve deficit.      Motor: No weakness.    Psychiatric:         Mood and Affect: Mood normal.         Thought Content: Thought content normal.         Assessment/Plan   Diagnoses and all orders for this visit:  Persistent atrial fibrillation (CMS/HCC)  Other pulmonary embolism without acute cor pulmonale, unspecified chronicity (CMS/HCC)  Stage 3a chronic kidney disease (CMS/HCC)  Sepsis due to Streptococcus species with acute respiratory failure, unspecified whether hypoxia or hypercapnia present, unspecified whether septic shock present (CMS/HCC)  Chronic respiratory failure with hypoxia (CMS/HCC)  Acute on chronic respiratory failure with hypoxia (CMS/HCC)        Pt seen and examined. Continues to benefit from therapy, if patient is discharged to [facility] there is a high chance that patient will be readmitted to hospital. Recommend further therapy in a skilled facility so patient can maximize independence as he is slowly improving with PT/OT. Continue supportive care, routine labs.    >30 minutes spent in management of pt

## 2023-08-09 ENCOUNTER — NURSING HOME VISIT (OUTPATIENT)
Dept: POST ACUTE CARE | Facility: EXTERNAL LOCATION | Age: 78
End: 2023-08-09
Payer: MEDICARE

## 2023-08-09 DIAGNOSIS — A40.9 SEPSIS DUE TO STREPTOCOCCUS SPECIES WITH ACUTE RESPIRATORY FAILURE, UNSPECIFIED WHETHER HYPOXIA OR HYPERCAPNIA PRESENT, UNSPECIFIED WHETHER SEPTIC SHOCK PRESENT (MULTI): ICD-10-CM

## 2023-08-09 DIAGNOSIS — I48.19 PERSISTENT ATRIAL FIBRILLATION (MULTI): Primary | ICD-10-CM

## 2023-08-09 DIAGNOSIS — N18.31 STAGE 3A CHRONIC KIDNEY DISEASE (MULTI): ICD-10-CM

## 2023-08-09 DIAGNOSIS — J96.21 ACUTE ON CHRONIC RESPIRATORY FAILURE WITH HYPOXIA (MULTI): ICD-10-CM

## 2023-08-09 DIAGNOSIS — I26.99 OTHER PULMONARY EMBOLISM WITHOUT ACUTE COR PULMONALE, UNSPECIFIED CHRONICITY (MULTI): ICD-10-CM

## 2023-08-09 DIAGNOSIS — J96.11 CHRONIC RESPIRATORY FAILURE WITH HYPOXIA (MULTI): ICD-10-CM

## 2023-08-09 DIAGNOSIS — J96.00 SEPSIS DUE TO STREPTOCOCCUS SPECIES WITH ACUTE RESPIRATORY FAILURE, UNSPECIFIED WHETHER HYPOXIA OR HYPERCAPNIA PRESENT, UNSPECIFIED WHETHER SEPTIC SHOCK PRESENT (MULTI): ICD-10-CM

## 2023-08-09 DIAGNOSIS — R65.20 SEPSIS DUE TO STREPTOCOCCUS SPECIES WITH ACUTE RESPIRATORY FAILURE, UNSPECIFIED WHETHER HYPOXIA OR HYPERCAPNIA PRESENT, UNSPECIFIED WHETHER SEPTIC SHOCK PRESENT (MULTI): ICD-10-CM

## 2023-08-09 PROCEDURE — 99308 SBSQ NF CARE LOW MDM 20: CPT | Performed by: STUDENT IN AN ORGANIZED HEALTH CARE EDUCATION/TRAINING PROGRAM

## 2023-08-09 NOTE — LETTER
Patient: Dustin Draper  : 1945    Encounter Date: 2023    Subjective  Patient ID: Dustin Draper is a 78 y.o. male.    Pt seen at bedside resting in chair. Reports overall doing well. Still slightly improving with therapy and breathing overall stable. Reports no additional issues or concerns.        Review of Systems   Constitutional: Negative.    HENT: Negative.     Respiratory: Negative.     Cardiovascular: Negative.    Gastrointestinal: Negative.    Musculoskeletal: Negative.    Skin: Negative.    Neurological: Negative.    Psychiatric/Behavioral: Negative.         ObjectiveVitals Reviewed via facility EMR   Physical Exam  Vitals and nursing note reviewed.   Constitutional:       General: He is not in acute distress.     Appearance: Normal appearance.   HENT:      Mouth/Throat:      Mouth: Mucous membranes are moist.      Pharynx: Oropharynx is clear. No oropharyngeal exudate.   Eyes:      Extraocular Movements: Extraocular movements intact.      Pupils: Pupils are equal, round, and reactive to light.   Cardiovascular:      Rate and Rhythm: Normal rate and regular rhythm.      Pulses: Normal pulses.      Heart sounds: Normal heart sounds. No murmur heard.  Pulmonary:      Effort: Pulmonary effort is normal. No respiratory distress.   Abdominal:      General: Abdomen is flat. Bowel sounds are normal. There is no distension.      Tenderness: There is no abdominal tenderness.      Comments: Obese, sitting at bedside   Musculoskeletal:         General: Normal range of motion.      Right lower leg: No edema.      Left lower leg: No edema.   Skin:     General: Skin is warm and dry.      Capillary Refill: Capillary refill takes less than 2 seconds.   Neurological:      General: No focal deficit present.      Mental Status: He is alert. Mental status is at baseline.      Cranial Nerves: No cranial nerve deficit.      Motor: No weakness.   Psychiatric:         Mood and Affect: Mood normal.         Thought  Content: Thought content normal.         Assessment/Plan  Diagnoses and all orders for this visit:  Persistent atrial fibrillation (CMS/HCC)  Other pulmonary embolism without acute cor pulmonale, unspecified chronicity (CMS/HCC)  Stage 3a chronic kidney disease (CMS/HCC)  Sepsis due to Streptococcus species with acute respiratory failure, unspecified whether hypoxia or hypercapnia present, unspecified whether septic shock present (CMS/HCC)  Acute on chronic respiratory failure with hypoxia (CMS/HCC)  Chronic respiratory failure with hypoxia (CMS/[facility])    Pt seen and examined, overall medically stable. Continues to make small improvements with skilled therapy as he is out of bed more and gaining additional functional independent. Would benefit from additional sessions in a skilled facility. Continue supportive care.      Electronically Signed By: Ramesh Harmon DO   8/10/23  9:54 PM

## 2023-08-10 ASSESSMENT — ENCOUNTER SYMPTOMS
CONSTITUTIONAL NEGATIVE: 1
NEUROLOGICAL NEGATIVE: 1
RESPIRATORY NEGATIVE: 1
GASTROINTESTINAL NEGATIVE: 1
PSYCHIATRIC NEGATIVE: 1
CARDIOVASCULAR NEGATIVE: 1
MUSCULOSKELETAL NEGATIVE: 1

## 2023-08-11 ENCOUNTER — NURSING HOME VISIT (OUTPATIENT)
Dept: POST ACUTE CARE | Facility: EXTERNAL LOCATION | Age: 78
End: 2023-08-11
Payer: MEDICARE

## 2023-08-11 DIAGNOSIS — I26.99 OTHER PULMONARY EMBOLISM WITHOUT ACUTE COR PULMONALE, UNSPECIFIED CHRONICITY (MULTI): ICD-10-CM

## 2023-08-11 DIAGNOSIS — I48.19 PERSISTENT ATRIAL FIBRILLATION (MULTI): Primary | ICD-10-CM

## 2023-08-11 DIAGNOSIS — E87.6 HYPOKALEMIA: ICD-10-CM

## 2023-08-11 DIAGNOSIS — J96.11 CHRONIC RESPIRATORY FAILURE WITH HYPOXIA (MULTI): ICD-10-CM

## 2023-08-11 DIAGNOSIS — N18.31 STAGE 3A CHRONIC KIDNEY DISEASE (MULTI): ICD-10-CM

## 2023-08-11 DIAGNOSIS — R78.81 BACTEREMIA: ICD-10-CM

## 2023-08-11 PROCEDURE — 99308 SBSQ NF CARE LOW MDM 20: CPT | Performed by: STUDENT IN AN ORGANIZED HEALTH CARE EDUCATION/TRAINING PROGRAM

## 2023-08-11 NOTE — PROGRESS NOTES
Subjective   Patient ID: Dustin Draper is a 78 y.o. male.    Pt seen at bedside resting in chair. Reports overall doing well. Still slightly improving with therapy and breathing overall stable. Reports no additional issues or concerns.        Review of Systems   Constitutional: Negative.    HENT: Negative.     Respiratory: Negative.     Cardiovascular: Negative.    Gastrointestinal: Negative.    Musculoskeletal: Negative.    Skin: Negative.    Neurological: Negative.    Psychiatric/Behavioral: Negative.         Objective Vitals Reviewed via facility EMR   Physical Exam  Vitals and nursing note reviewed.   Constitutional:       General: He is not in acute distress.     Appearance: Normal appearance.   HENT:      Mouth/Throat:      Mouth: Mucous membranes are moist.      Pharynx: Oropharynx is clear. No oropharyngeal exudate.   Eyes:      Extraocular Movements: Extraocular movements intact.      Pupils: Pupils are equal, round, and reactive to light.   Cardiovascular:      Rate and Rhythm: Normal rate and regular rhythm.      Pulses: Normal pulses.      Heart sounds: Normal heart sounds. No murmur heard.  Pulmonary:      Effort: Pulmonary effort is normal. No respiratory distress.   Abdominal:      General: Abdomen is flat. Bowel sounds are normal. There is no distension.      Tenderness: There is no abdominal tenderness.      Comments: Obese, sitting at bedside   Musculoskeletal:         General: Normal range of motion.      Right lower leg: No edema.      Left lower leg: No edema.   Skin:     General: Skin is warm and dry.      Capillary Refill: Capillary refill takes less than 2 seconds.   Neurological:      General: No focal deficit present.      Mental Status: He is alert. Mental status is at baseline.      Cranial Nerves: No cranial nerve deficit.      Motor: No weakness.   Psychiatric:         Mood and Affect: Mood normal.         Thought Content: Thought content normal.         Assessment/Plan   Diagnoses  and all orders for this visit:  Persistent atrial fibrillation (CMS/HCC)  Other pulmonary embolism without acute cor pulmonale, unspecified chronicity (CMS/HCC)  Stage 3a chronic kidney disease (CMS/HCC)  Sepsis due to Streptococcus species with acute respiratory failure, unspecified whether hypoxia or hypercapnia present, unspecified whether septic shock present (CMS/HCC)  Acute on chronic respiratory failure with hypoxia (CMS/HCC)  Chronic respiratory failure with hypoxia (CMS/HCC)    Pt seen and examined, overall medically stable. Continues to make small improvements with skilled therapy as he is out of bed more and gaining additional functional independent. Would benefit from additional sessions in a skilled facility. Continue supportive care.

## 2023-08-11 NOTE — LETTER
Patient: Dustin Draper  : 1945    Encounter Date: 2023    Subjective  Patient ID: Dustin Draper is a 78 y.o. male.    PT seen and examined, overall medically stable, still slightly improving with therapy. Regards no additional issues. Believes he would still benefit from additional therapy. No additional issues.        Review of Systems   Constitutional: Negative.    HENT: Negative.     Respiratory: Negative.     Cardiovascular: Negative.    Gastrointestinal: Negative.    Musculoskeletal: Negative.    Skin: Negative.    Neurological: Negative.    Psychiatric/Behavioral: Negative.         ObjectiveVitals Reviewed via facility EMR   Physical Exam  Vitals and nursing note reviewed.   Constitutional:       General: He is not in acute distress.     Appearance: Normal appearance.   HENT:      Mouth/Throat:      Mouth: Mucous membranes are moist.      Pharynx: Oropharynx is clear. No oropharyngeal exudate.   Eyes:      Extraocular Movements: Extraocular movements intact.      Pupils: Pupils are equal, round, and reactive to light.   Cardiovascular:      Rate and Rhythm: Normal rate and regular rhythm.      Pulses: Normal pulses.      Heart sounds: Normal heart sounds. No murmur heard.  Pulmonary:      Effort: Pulmonary effort is normal. No respiratory distress.   Abdominal:      General: Abdomen is flat. Bowel sounds are normal. There is no distension.      Tenderness: There is no abdominal tenderness.      Comments: Obese, in bed   Musculoskeletal:         General: Normal range of motion.      Right lower leg: No edema.      Left lower leg: No edema.   Skin:     General: Skin is warm and dry.      Capillary Refill: Capillary refill takes less than 2 seconds.   Neurological:      General: No focal deficit present.      Mental Status: He is alert. Mental status is at baseline.      Cranial Nerves: No cranial nerve deficit.      Motor: No weakness.   Psychiatric:         Mood and Affect: Mood normal.          Thought Content: Thought content normal.         Assessment/Plan  Diagnoses and all orders for this visit:  Persistent atrial fibrillation (CMS/HCC)  Other pulmonary embolism without acute cor pulmonale, unspecified chronicity (CMS/HCC)  Stage 3a chronic kidney disease (CMS/HCC)  Hypokalemia  Bacteremia  Chronic respiratory failure with hypoxia (CMS/HCC)    Pt seen and examined, overall medically stable, continue supportive care, routine labs, pt is benefitting from SNF, encourage continued therapy, if patient is being discharged, recommend long term care      Electronically Signed By: Ramesh Harmon DO   8/15/23  9:45 PM

## 2023-08-15 ASSESSMENT — ENCOUNTER SYMPTOMS
CONSTITUTIONAL NEGATIVE: 1
GASTROINTESTINAL NEGATIVE: 1
PSYCHIATRIC NEGATIVE: 1
MUSCULOSKELETAL NEGATIVE: 1
CARDIOVASCULAR NEGATIVE: 1
NEUROLOGICAL NEGATIVE: 1
RESPIRATORY NEGATIVE: 1

## 2023-08-16 ENCOUNTER — NURSING HOME VISIT (OUTPATIENT)
Dept: POST ACUTE CARE | Facility: EXTERNAL LOCATION | Age: 78
End: 2023-08-16
Payer: MEDICARE

## 2023-08-16 DIAGNOSIS — J43.8 OTHER EMPHYSEMA (MULTI): ICD-10-CM

## 2023-08-16 DIAGNOSIS — J96.11 CHRONIC RESPIRATORY FAILURE WITH HYPOXIA (MULTI): ICD-10-CM

## 2023-08-16 DIAGNOSIS — N18.31 STAGE 3A CHRONIC KIDNEY DISEASE (MULTI): Primary | ICD-10-CM

## 2023-08-16 DIAGNOSIS — J96.21 ACUTE ON CHRONIC RESPIRATORY FAILURE WITH HYPOXIA (MULTI): ICD-10-CM

## 2023-08-16 DIAGNOSIS — I26.99 OTHER PULMONARY EMBOLISM WITHOUT ACUTE COR PULMONALE, UNSPECIFIED CHRONICITY (MULTI): ICD-10-CM

## 2023-08-16 PROCEDURE — 99309 SBSQ NF CARE MODERATE MDM 30: CPT | Performed by: STUDENT IN AN ORGANIZED HEALTH CARE EDUCATION/TRAINING PROGRAM

## 2023-08-16 NOTE — PROGRESS NOTES
Subjective   Patient ID: Dustin Draper is a 78 y.o. male.    PT seen and examined, overall medically stable, still slightly improving with therapy. Regards no additional issues. Believes he would still benefit from additional therapy. No additional issues.        Review of Systems   Constitutional: Negative.    HENT: Negative.     Respiratory: Negative.     Cardiovascular: Negative.    Gastrointestinal: Negative.    Musculoskeletal: Negative.    Skin: Negative.    Neurological: Negative.    Psychiatric/Behavioral: Negative.         Objective Vitals Reviewed via facility EMR   Physical Exam  Vitals and nursing note reviewed.   Constitutional:       General: He is not in acute distress.     Appearance: Normal appearance.   HENT:      Mouth/Throat:      Mouth: Mucous membranes are moist.      Pharynx: Oropharynx is clear. No oropharyngeal exudate.   Eyes:      Extraocular Movements: Extraocular movements intact.      Pupils: Pupils are equal, round, and reactive to light.   Cardiovascular:      Rate and Rhythm: Normal rate and regular rhythm.      Pulses: Normal pulses.      Heart sounds: Normal heart sounds. No murmur heard.  Pulmonary:      Effort: Pulmonary effort is normal. No respiratory distress.   Abdominal:      General: Abdomen is flat. Bowel sounds are normal. There is no distension.      Tenderness: There is no abdominal tenderness.      Comments: Obese, in bed   Musculoskeletal:         General: Normal range of motion.      Right lower leg: No edema.      Left lower leg: No edema.   Skin:     General: Skin is warm and dry.      Capillary Refill: Capillary refill takes less than 2 seconds.   Neurological:      General: No focal deficit present.      Mental Status: He is alert. Mental status is at baseline.      Cranial Nerves: No cranial nerve deficit.      Motor: No weakness.   Psychiatric:         Mood and Affect: Mood normal.         Thought Content: Thought content normal.         Assessment/Plan    Diagnoses and all orders for this visit:  Persistent atrial fibrillation (CMS/HCC)  Other pulmonary embolism without acute cor pulmonale, unspecified chronicity (CMS/HCC)  Stage 3a chronic kidney disease (CMS/HCC)  Hypokalemia  Bacteremia  Chronic respiratory failure with hypoxia (CMS/HCC)    Pt seen and examined, overall medically stable, continue supportive care, routine labs, pt is benefitting from SNF, encourage continued therapy, if patient is being discharged, recommend long term care

## 2023-08-16 NOTE — LETTER
Patient: Dustin Draper  : 1945    Encounter Date: 2023    Subjective  Patient ID: Dustin Draper is a 78 y.o. male.    Pt is a 78 year old male with with afib, HTN, CHF, CKD, recent bacteremia who was  recently admitted to SNF s/p extensive hospitalization for bacteremia.Patient denies any acute issues and was lying comfortably on bed.      Review of Systems  Constitutional: Negative.  HET: Negative.  Respiratory: Negative.  Cardiovascular: Negative.  Gastrointestinal: Negative.  Musculoskeletal: Negative.  Skin: Negative.  Neurological: Negative.  Psychiatric/Behavioral: Negative.    Objective  Vitals and nursing notes reviewed    Physical Exam  Vitals and nursing note reviewed.  Constitutional:  General: He is not in acute distress.  Appearance: Normal appearance.  HENT:  Mouth/Throat:  Mouth: Mucous membranes are moist.  Pharynx: Oropharynx is clear. No oropharyngeal exudate.  Eyes:  Extraocular Movements: Extraocular movements intact.  Pupils: Pupils are equal, round, and reactive to light.  Cardiovascular:  Rate and Rhythm: Normal rate and regular rhythm.  Pulses: Normal pulses.  Heart sounds: Normal heart sounds. No murmur heard.  Pulmonary:  Effort: Pulmonary effort is normal. No respiratory distress.  Comments: overall stable  Abdominal:  General: Abdomen is flat. Bowel sounds are normal. There is no distension.  Tenderness: There is no abdominal tenderness.  Musculoskeletal:  General: Normal range of motion.  Right lower leg: No edema.  Left lower leg: No edema.  Skin:  General: Skin is warm and dry.  Capillary Refill: Capillary refill takes less than 2 seconds.  Neurological:  General: No focal deficit present.  Mental Status: He is alert. Mental status is at baseline.  Cranial Nerves: No cranial nerve deficit.  Motor: No weakness.  Psychiatric:  Mood and Affect: Mood normal.  Thought Content: Thought content normal.          Assessment/Plan  Diagnoses and all orders for this visit:  Stage  3a chronic kidney disease (CMS/HCC)  Other pulmonary embolism without acute cor pulmonale, unspecified chronicity (CMS/HCC)  Chronic respiratory failure with hypoxia (CMS/HCC)  Acute on chronic respiratory failure with hypoxia (CMS/HCC)  Other emphysema (CMS/HCC)      #Persistent atrial fibrillation  #Stage 3a chronic kidney disease  #Urinary retention  #Bacteremia  #Sepsis due to Streptococcus species with acute respiratory failure, unspecified  whether hypoxia or hypercapnia present, unspecified whether septic shock present  #Acute on chronic respiratory failure with hypoxia   #Other pulmonary embolism without acute cor pulmonale, unspecified chronicity    Pt seen and examined. He is overall medically stable. Breathing optimized, hospital course  reviewed, encourage pt/ot, supportive care, weekly labs  In addition, due to multiple medical issues The resident will need a hospital bed for discharge.  The beneficiary requires frequent changes in body position and has an immediate need for a change in body position due to diagnosis of CHRONIC OBSTRUCTIVE PULMONARY DISEASE, UNSPECIFIED, ACUTE RESPIRATORY FAILURE WITH HYPOXIA, OTHER PULMONARY EMBOLISM WITHOUT ACUTE COR PULMONALE, in addition to diagnosis listed as above    In addition to this the patient will also require a wheelchair complete functional and daily ADL’s such as bathing, grooming, feeding.  Resident can self-propel the wheelchair and will need the wheelchair due to DIFFICULTY IN WALKING, NOT ELSEWHERE CLASSIFIED, UNSPECIFIED LACK OF COORDINATION, ABNORMAL POSTURE, WEAKNESS. Patient is in need of a wheelchair due to him not being able to ambulate with a cane or walker.    >30 minutes spent in management of pt      Electronically Signed By: Ramesh Harmon DO   8/16/23 11:06 PM

## 2023-08-17 NOTE — PROGRESS NOTES
Subjective   Patient ID: Dustin Draper is a 78 y.o. male.    Pt is a 78 year old male with with afib, HTN, CHF, CKD, recent bacteremia who was  recently admitted to SNF s/p extensive hospitalization for bacteremia.Patient denies any acute issues and was lying comfortably on bed.      Review of Systems  Constitutional: Negative.  HET: Negative.  Respiratory: Negative.  Cardiovascular: Negative.  Gastrointestinal: Negative.  Musculoskeletal: Negative.  Skin: Negative.  Neurological: Negative.  Psychiatric/Behavioral: Negative.    Objective  Vitals and nursing notes reviewed    Physical Exam  Vitals and nursing note reviewed.  Constitutional:  General: He is not in acute distress.  Appearance: Normal appearance.  HENT:  Mouth/Throat:  Mouth: Mucous membranes are moist.  Pharynx: Oropharynx is clear. No oropharyngeal exudate.  Eyes:  Extraocular Movements: Extraocular movements intact.  Pupils: Pupils are equal, round, and reactive to light.  Cardiovascular:  Rate and Rhythm: Normal rate and regular rhythm.  Pulses: Normal pulses.  Heart sounds: Normal heart sounds. No murmur heard.  Pulmonary:  Effort: Pulmonary effort is normal. No respiratory distress.  Comments: overall stable  Abdominal:  General: Abdomen is flat. Bowel sounds are normal. There is no distension.  Tenderness: There is no abdominal tenderness.  Musculoskeletal:  General: Normal range of motion.  Right lower leg: No edema.  Left lower leg: No edema.  Skin:  General: Skin is warm and dry.  Capillary Refill: Capillary refill takes less than 2 seconds.  Neurological:  General: No focal deficit present.  Mental Status: He is alert. Mental status is at baseline.  Cranial Nerves: No cranial nerve deficit.  Motor: No weakness.  Psychiatric:  Mood and Affect: Mood normal.  Thought Content: Thought content normal.          Assessment/Plan   Diagnoses and all orders for this visit:  Stage 3a chronic kidney disease (CMS/HCC)  Other pulmonary embolism without  acute cor pulmonale, unspecified chronicity (CMS/HCC)  Chronic respiratory failure with hypoxia (CMS/HCC)  Acute on chronic respiratory failure with hypoxia (CMS/HCC)  Other emphysema (CMS/HCC)      #Persistent atrial fibrillation  #Stage 3a chronic kidney disease  #Urinary retention  #Bacteremia  #Sepsis due to Streptococcus species with acute respiratory failure, unspecified  whether hypoxia or hypercapnia present, unspecified whether septic shock present  #Acute on chronic respiratory failure with hypoxia   #Other pulmonary embolism without acute cor pulmonale, unspecified chronicity    Pt seen and examined. He is overall medically stable. Breathing optimized, hospital course  reviewed, encourage pt/ot, supportive care, weekly labs  In addition, due to multiple medical issues The resident will need a hospital bed for discharge.  The beneficiary requires frequent changes in body position and has an immediate need for a change in body position due to diagnosis of CHRONIC OBSTRUCTIVE PULMONARY DISEASE, UNSPECIFIED, ACUTE RESPIRATORY FAILURE WITH HYPOXIA, OTHER PULMONARY EMBOLISM WITHOUT ACUTE COR PULMONALE, in addition to diagnosis listed as above    In addition to this the patient will also require a wheelchair complete functional and daily ADL’s such as bathing, grooming, feeding.  Resident can self-propel the wheelchair and will need the wheelchair due to DIFFICULTY IN WALKING, NOT ELSEWHERE CLASSIFIED, UNSPECIFIED LACK OF COORDINATION, ABNORMAL POSTURE, WEAKNESS. Patient is in need of a wheelchair due to him not being able to ambulate with a cane or walker.    >30 minutes spent in management of pt

## 2023-08-21 ENCOUNTER — NURSING HOME VISIT (OUTPATIENT)
Dept: POST ACUTE CARE | Facility: EXTERNAL LOCATION | Age: 78
End: 2023-08-21
Payer: MEDICARE

## 2023-08-21 DIAGNOSIS — J96.21 ACUTE ON CHRONIC RESPIRATORY FAILURE WITH HYPOXIA (MULTI): ICD-10-CM

## 2023-08-21 DIAGNOSIS — U07.1 COVID-19: ICD-10-CM

## 2023-08-21 DIAGNOSIS — I26.99 OTHER PULMONARY EMBOLISM WITHOUT ACUTE COR PULMONALE, UNSPECIFIED CHRONICITY (MULTI): ICD-10-CM

## 2023-08-21 DIAGNOSIS — J96.11 CHRONIC RESPIRATORY FAILURE WITH HYPOXIA (MULTI): ICD-10-CM

## 2023-08-21 DIAGNOSIS — N18.31 STAGE 3A CHRONIC KIDNEY DISEASE (MULTI): ICD-10-CM

## 2023-08-21 DIAGNOSIS — J43.8 OTHER EMPHYSEMA (MULTI): ICD-10-CM

## 2023-08-21 DIAGNOSIS — I48.19 PERSISTENT ATRIAL FIBRILLATION (MULTI): Primary | ICD-10-CM

## 2023-08-21 PROCEDURE — 99309 SBSQ NF CARE MODERATE MDM 30: CPT | Performed by: STUDENT IN AN ORGANIZED HEALTH CARE EDUCATION/TRAINING PROGRAM

## 2023-08-21 NOTE — LETTER
Patient: Dustin Draper  : 1945    Encounter Date: 2023    Subjective  Patient ID: Dustin Draper is a 78 y.o. male.    Pt is a 78 year old male with with afib, HTN, CHF, CKD, recent bacteremia who was  recently admitted to SNF s/p extensive hospitalization for bacteremia.Patient denies any acute issues and was lying comfortably on bed.  Recently tested positive for COVID 19, on contact precautions.      Review of Systems  Constitutional: Negative.  HET: Negative.  Respiratory: Negative.  Cardiovascular: Negative.  Gastrointestinal: Negative.  Musculoskeletal: Negative.  Skin: Negative.  Neurological: Negative.  Psychiatric/Behavioral: Negative.    Objective  Vitals and nursing notes reviewed    Physical Exam  Vitals and nursing note reviewed.  Constitutional:  General: He is not in acute distress.  Appearance: Normal appearance.  HENT:  Mouth/Throat:  Mouth: Mucous membranes are moist.  Pharynx: Oropharynx is clear. No oropharyngeal exudate.  Eyes:  Extraocular Movements: Extraocular movements intact.  Pupils: Pupils are equal, round, and reactive to light.  Cardiovascular:  Rate and Rhythm: Normal rate and regular rhythm.  Pulses: Normal pulses.  Heart sounds: Normal heart sounds. No murmur heard.  Pulmonary:  Effort: Pulmonary effort is normal. No respiratory distress.  Comments: overall stable  Abdominal:  General: Abdomen is flat. Bowel sounds are normal. There is no distension.  Tenderness: There is no abdominal tenderness.  Musculoskeletal:  General: Normal range of motion.  Right lower leg: No edema.  Left lower leg: No edema.  Skin:  General: Skin is warm and dry.  Capillary Refill: Capillary refill takes less than 2 seconds.  Neurological:  General: No focal deficit present.  Mental Status: He is alert. Mental status is at baseline.  Cranial Nerves: No cranial nerve deficit.  Motor: No weakness.  Psychiatric:  Mood and Affect: Mood normal.  Thought Content: Thought content  normal.              Assessment/Plan  Diagnoses and all orders for this visit:  Persistent atrial fibrillation (CMS/HCC)  Other pulmonary embolism without acute cor pulmonale, unspecified chronicity (CMS/HCC)  Other emphysema (CMS/HCC)  Acute on chronic respiratory failure with hypoxia (CMS/HCC)  Chronic respiratory failure with hypoxia (CMS/HCC)  Stage 3a chronic kidney disease (CMS/HCC)  COVID-19    #Persistent atrial fibrillation  #Stage 3a chronic kidney disease  #Urinary retention  #Bacteremia  #Sepsis due to Streptococcus species with acute respiratory failure, unspecified  whether hypoxia or hypercapnia present, unspecified whether septic shock present  #Acute on chronic respiratory failure with hypoxia   #Other pulmonary embolism without acute cor pulmonale, unspecified chronicity  #COVID-19 , without any new symptoms    Pt seen and examined. He is overall medically stable. Breathing optimized, hospital course  reviewed, encourage pt/ot, supportive care, weekly labs. Due to patient being asymptomatic from covid and unsure of when symptoms started is not candidate for anti-viral therapy. Continue contact precautions. Supportive care    >30 minutes spent in management of pt      Electronically Signed By: Ramesh Harmon DO   8/24/23  8:39 PM

## 2023-08-23 ENCOUNTER — NURSING HOME VISIT (OUTPATIENT)
Dept: POST ACUTE CARE | Facility: EXTERNAL LOCATION | Age: 78
End: 2023-08-23
Payer: MEDICARE

## 2023-08-23 DIAGNOSIS — J96.11 CHRONIC RESPIRATORY FAILURE WITH HYPOXIA (MULTI): ICD-10-CM

## 2023-08-23 DIAGNOSIS — I48.19 PERSISTENT ATRIAL FIBRILLATION (MULTI): Primary | ICD-10-CM

## 2023-08-23 DIAGNOSIS — A04.72 CLOSTRIDIOIDES DIFFICILE DIARRHEA: ICD-10-CM

## 2023-08-23 DIAGNOSIS — J96.21 ACUTE ON CHRONIC RESPIRATORY FAILURE WITH HYPOXIA (MULTI): ICD-10-CM

## 2023-08-23 DIAGNOSIS — I26.99 OTHER PULMONARY EMBOLISM WITHOUT ACUTE COR PULMONALE, UNSPECIFIED CHRONICITY (MULTI): ICD-10-CM

## 2023-08-23 DIAGNOSIS — R65.20 SEPSIS DUE TO STREPTOCOCCUS SPECIES WITH ACUTE RESPIRATORY FAILURE, UNSPECIFIED WHETHER HYPOXIA OR HYPERCAPNIA PRESENT, UNSPECIFIED WHETHER SEPTIC SHOCK PRESENT (MULTI): ICD-10-CM

## 2023-08-23 DIAGNOSIS — N18.31 STAGE 3A CHRONIC KIDNEY DISEASE (MULTI): ICD-10-CM

## 2023-08-23 DIAGNOSIS — A40.9 SEPSIS DUE TO STREPTOCOCCUS SPECIES WITH ACUTE RESPIRATORY FAILURE, UNSPECIFIED WHETHER HYPOXIA OR HYPERCAPNIA PRESENT, UNSPECIFIED WHETHER SEPTIC SHOCK PRESENT (MULTI): ICD-10-CM

## 2023-08-23 DIAGNOSIS — J96.00 SEPSIS DUE TO STREPTOCOCCUS SPECIES WITH ACUTE RESPIRATORY FAILURE, UNSPECIFIED WHETHER HYPOXIA OR HYPERCAPNIA PRESENT, UNSPECIFIED WHETHER SEPTIC SHOCK PRESENT (MULTI): ICD-10-CM

## 2023-08-23 PROCEDURE — 99309 SBSQ NF CARE MODERATE MDM 30: CPT | Performed by: STUDENT IN AN ORGANIZED HEALTH CARE EDUCATION/TRAINING PROGRAM

## 2023-08-23 NOTE — LETTER
Patient: Dustin Draper  : 1945    Encounter Date: 2023    Subjective  Patient ID: Dustin Draper is a 78 y.o. male.    Pt is a 78 year old male with with afib, HTN, CHF, CKD, recent bacteremia who was  recently admitted to SNF s/p extensive hospitalization for bacteremia.Patient denies any acute issues and was lying comfortably on bed.  Recently tested positive for COVID 19, on contact precautions.      Review of Systems  Constitutional: Negative.  HET: Negative.  Respiratory: Negative.  Cardiovascular: Negative.  Gastrointestinal: Negative.  Musculoskeletal: Negative.  Skin: Negative.  Neurological: Negative.  Psychiatric/Behavioral: Negative.    Objective  Vitals and nursing notes reviewed    Physical Exam  Vitals and nursing note reviewed.  Constitutional:  General: He is not in acute distress.  Appearance: Normal appearance.  HENT:  Mouth/Throat:  Mouth: Mucous membranes are moist.  Pharynx: Oropharynx is clear. No oropharyngeal exudate.  Eyes:  Extraocular Movements: Extraocular movements intact.  Pupils: Pupils are equal, round, and reactive to light.  Cardiovascular:  Rate and Rhythm: Normal rate and regular rhythm.  Pulses: Normal pulses.  Heart sounds: Normal heart sounds. No murmur heard.  Pulmonary:  Effort: Pulmonary effort is normal. No respiratory distress.  Comments: overall stable  Abdominal:  General: Abdomen is flat. Bowel sounds are normal. There is no distension.  Tenderness: There is no abdominal tenderness.  Musculoskeletal:  General: Normal range of motion.  Right lower leg: No edema.  Left lower leg: No edema.  Skin:  General: Skin is warm and dry.  Capillary Refill: Capillary refill takes less than 2 seconds.  Neurological:  General: No focal deficit present.  Mental Status: He is alert. Mental status is at baseline.  Cranial Nerves: No cranial nerve deficit.  Motor: No weakness.  Psychiatric:  Mood and Affect: Mood normal.  Thought Content: Thought content  normal.        Assessment/Plan  Diagnoses and all orders for this visit:  Persistent atrial fibrillation (CMS/[facility])  Other pulmonary embolism without acute cor pulmonale, unspecified chronicity (CMS/[facility])  Clostridioides difficile diarrhea  Sepsis due to Streptococcus species with acute respiratory failure, unspecified whether hypoxia or hypercapnia present, unspecified whether septic shock present (CMS/[facility])  Stage 3a chronic kidney disease (CMS/[facility])  Chronic respiratory failure with hypoxia (CMS/[facility])  Acute on chronic respiratory failure with hypoxia (CMS/[facility])      Pt seen and examined. He is overall medically stable. Breathing optimized, hospital course  reviewed, encourage pt/ot, supportive care, weekly labs. Continue contact precautions.      Post visit: pt noted to be having peristent diarrhea, c.diff testing sent and noted to be positive, will start oral escamilla 4 times a day    >30 minutes spent in management of pt      Electronically Signed By: Ramesh Harmon DO   8/24/23 10:06 PM

## 2023-08-24 PROBLEM — A04.72 CLOSTRIDIOIDES DIFFICILE DIARRHEA: Status: ACTIVE | Noted: 2023-08-24

## 2023-08-24 PROBLEM — U07.1 COVID-19: Status: ACTIVE | Noted: 2023-08-24

## 2023-08-25 NOTE — PROGRESS NOTES
Subjective   Patient ID: Dustin Draper is a 78 y.o. male.    Pt is a 78 year old male with with afib, HTN, CHF, CKD, recent bacteremia who was  recently admitted to SNF s/p extensive hospitalization for bacteremia.Patient denies any acute issues and was lying comfortably on bed.  Recently tested positive for COVID 19, on contact precautions.      Review of Systems  Constitutional: Negative.  HET: Negative.  Respiratory: Negative.  Cardiovascular: Negative.  Gastrointestinal: Negative.  Musculoskeletal: Negative.  Skin: Negative.  Neurological: Negative.  Psychiatric/Behavioral: Negative.    Objective  Vitals and nursing notes reviewed    Physical Exam  Vitals and nursing note reviewed.  Constitutional:  General: He is not in acute distress.  Appearance: Normal appearance.  HENT:  Mouth/Throat:  Mouth: Mucous membranes are moist.  Pharynx: Oropharynx is clear. No oropharyngeal exudate.  Eyes:  Extraocular Movements: Extraocular movements intact.  Pupils: Pupils are equal, round, and reactive to light.  Cardiovascular:  Rate and Rhythm: Normal rate and regular rhythm.  Pulses: Normal pulses.  Heart sounds: Normal heart sounds. No murmur heard.  Pulmonary:  Effort: Pulmonary effort is normal. No respiratory distress.  Comments: overall stable  Abdominal:  General: Abdomen is flat. Bowel sounds are normal. There is no distension.  Tenderness: There is no abdominal tenderness.  Musculoskeletal:  General: Normal range of motion.  Right lower leg: No edema.  Left lower leg: No edema.  Skin:  General: Skin is warm and dry.  Capillary Refill: Capillary refill takes less than 2 seconds.  Neurological:  General: No focal deficit present.  Mental Status: He is alert. Mental status is at baseline.  Cranial Nerves: No cranial nerve deficit.  Motor: No weakness.  Psychiatric:  Mood and Affect: Mood normal.  Thought Content: Thought content normal.              Assessment/Plan   Diagnoses and all orders for this  visit:  Persistent atrial fibrillation (CMS/HCC)  Other pulmonary embolism without acute cor pulmonale, unspecified chronicity (CMS/HCC)  Other emphysema (CMS/HCC)  Acute on chronic respiratory failure with hypoxia (CMS/HCC)  Chronic respiratory failure with hypoxia (CMS/HCC)  Stage 3a chronic kidney disease (CMS/HCC)  COVID-19    #Persistent atrial fibrillation  #Stage 3a chronic kidney disease  #Urinary retention  #Bacteremia  #Sepsis due to Streptococcus species with acute respiratory failure, unspecified  whether hypoxia or hypercapnia present, unspecified whether septic shock present  #Acute on chronic respiratory failure with hypoxia   #Other pulmonary embolism without acute cor pulmonale, unspecified chronicity  #COVID-19 , without any new symptoms    Pt seen and examined. He is overall medically stable. Breathing optimized, hospital course  reviewed, encourage pt/ot, supportive care, weekly labs. Due to patient being asymptomatic from covid and unsure of when symptoms started is not candidate for anti-viral therapy. Continue contact precautions. Supportive care    >30 minutes spent in management of pt

## 2023-08-25 NOTE — PROGRESS NOTES
Subjective   Patient ID: Dustin Draper is a 78 y.o. male.    Pt is a 78 year old male with with afib, HTN, CHF, CKD, recent bacteremia who was  recently admitted to SNF s/p extensive hospitalization for bacteremia.Patient denies any acute issues and was lying comfortably on bed.  Recently tested positive for COVID 19, on contact precautions.      Review of Systems  Constitutional: Negative.  HET: Negative.  Respiratory: Negative.  Cardiovascular: Negative.  Gastrointestinal: Negative.  Musculoskeletal: Negative.  Skin: Negative.  Neurological: Negative.  Psychiatric/Behavioral: Negative.    Objective  Vitals and nursing notes reviewed    Physical Exam  Vitals and nursing note reviewed.  Constitutional:  General: He is not in acute distress.  Appearance: Normal appearance.  HENT:  Mouth/Throat:  Mouth: Mucous membranes are moist.  Pharynx: Oropharynx is clear. No oropharyngeal exudate.  Eyes:  Extraocular Movements: Extraocular movements intact.  Pupils: Pupils are equal, round, and reactive to light.  Cardiovascular:  Rate and Rhythm: Normal rate and regular rhythm.  Pulses: Normal pulses.  Heart sounds: Normal heart sounds. No murmur heard.  Pulmonary:  Effort: Pulmonary effort is normal. No respiratory distress.  Comments: overall stable  Abdominal:  General: Abdomen is flat. Bowel sounds are normal. There is no distension.  Tenderness: There is no abdominal tenderness.  Musculoskeletal:  General: Normal range of motion.  Right lower leg: No edema.  Left lower leg: No edema.  Skin:  General: Skin is warm and dry.  Capillary Refill: Capillary refill takes less than 2 seconds.  Neurological:  General: No focal deficit present.  Mental Status: He is alert. Mental status is at baseline.  Cranial Nerves: No cranial nerve deficit.  Motor: No weakness.  Psychiatric:  Mood and Affect: Mood normal.  Thought Content: Thought content normal.        Assessment/Plan   Diagnoses and all orders for this visit:  Persistent  atrial fibrillation (CMS/[facility])  Other pulmonary embolism without acute cor pulmonale, unspecified chronicity (CMS/[facility])  Clostridioides difficile diarrhea  Sepsis due to Streptococcus species with acute respiratory failure, unspecified whether hypoxia or hypercapnia present, unspecified whether septic shock present (CMS/[facility])  Stage 3a chronic kidney disease (CMS/[facility])  Chronic respiratory failure with hypoxia (CMS/[facility])  Acute on chronic respiratory failure with hypoxia (CMS/[facility])      Pt seen and examined. He is overall medically stable. Breathing optimized, hospital course  reviewed, encourage pt/ot, supportive care, weekly labs. Continue contact precautions.      Post visit: pt noted to be having peristent diarrhea, c.diff testing sent and noted to be positive, will start oral escamilla 4 times a day    >30 minutes spent in management of pt

## 2023-08-30 ENCOUNTER — DOCUMENTATION (OUTPATIENT)
Dept: PRIMARY CARE | Facility: CLINIC | Age: 78
End: 2023-08-30
Payer: MEDICARE

## 2023-09-02 ENCOUNTER — NURSING HOME VISIT (OUTPATIENT)
Dept: POST ACUTE CARE | Facility: EXTERNAL LOCATION | Age: 78
End: 2023-09-02
Payer: MEDICARE

## 2023-09-02 DIAGNOSIS — R26.2 AMBULATORY DYSFUNCTION: ICD-10-CM

## 2023-09-02 DIAGNOSIS — N18.31 STAGE 3A CHRONIC KIDNEY DISEASE (MULTI): ICD-10-CM

## 2023-09-02 DIAGNOSIS — J44.9 CHRONIC OBSTRUCTIVE PULMONARY DISEASE, UNSPECIFIED COPD TYPE (MULTI): ICD-10-CM

## 2023-09-02 DIAGNOSIS — A04.72 CLOSTRIDIOIDES DIFFICILE DIARRHEA: Primary | ICD-10-CM

## 2023-09-02 DIAGNOSIS — E78.5 HYPERLIPIDEMIA, UNSPECIFIED HYPERLIPIDEMIA TYPE: ICD-10-CM

## 2023-09-02 DIAGNOSIS — I48.19 PERSISTENT ATRIAL FIBRILLATION (MULTI): ICD-10-CM

## 2023-09-02 DIAGNOSIS — R01.1 HEART MURMUR: ICD-10-CM

## 2023-09-02 DIAGNOSIS — R53.81 PHYSICAL DEBILITY: ICD-10-CM

## 2023-09-02 DIAGNOSIS — I10 HYPERTENSION, UNSPECIFIED TYPE: ICD-10-CM

## 2023-09-02 DIAGNOSIS — Z79.01 CURRENT USE OF LONG TERM ANTICOAGULATION: ICD-10-CM

## 2023-09-02 PROCEDURE — 99345 HOME/RES VST NEW HIGH MDM 75: CPT | Performed by: FAMILY MEDICINE

## 2023-09-02 NOTE — LETTER
Patient: Dustin Draper  : 1945    Encounter Date: 2023    Documentation at [facility]     Problem List Items Addressed This Visit       Persistent atrial fibrillation (CMS/HCC)    Stage 3a chronic kidney disease (CMS/HCC)    Chronic obstructive pulmonary disease (CMS/HCC)    Clostridioides difficile diarrhea - Primary    Heart murmur    Physical debility    Ambulatory dysfunction    Hypertension    Hyperlipidemia    Current use of long term anticoagulation     Fell from ladder at home in 2023.  No fracture.  Had been driving, living independently prior to the fall.  Within a month was hospitalized with sepsis, PE  Due to prolonged hospitalization became very weak.  Rehab at [place]  Developed covid in 2023, relapsed in rehab  Developed C diff colitis after covid.  Will complete vancomycin 23    Now unable to ambulate.  Requires Amy lift.    Cardiologist is Dr. Oh Agustin - has follow up appointment in near future      Electronically Signed By: Theodore Cervantes DO   23  7:02 AM

## 2023-09-04 PROBLEM — K92.1 GASTROINTESTINAL HEMORRHAGE WITH MELENA: Status: RESOLVED | Noted: 2023-06-14 | Resolved: 2023-09-04

## 2023-09-04 PROBLEM — J96.21 ACUTE ON CHRONIC RESPIRATORY FAILURE WITH HYPOXIA (MULTI): Status: RESOLVED | Noted: 2023-06-28 | Resolved: 2023-09-04
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
## 2023-09-04 PROBLEM — A04.72 CLOSTRIDIOIDES DIFFICILE DIARRHEA: Status: RESOLVED | Noted: 2023-08-24 | Resolved: 2023-09-04

## 2023-09-04 PROBLEM — E78.5 HYPERLIPIDEMIA: Status: ACTIVE | Noted: 2023-09-04

## 2023-09-04 PROBLEM — A40.9: Status: RESOLVED | Noted: 2023-06-14 | Resolved: 2023-09-04

## 2023-09-04 PROBLEM — R01.1 HEART MURMUR: Status: ACTIVE | Noted: 2023-09-04

## 2023-09-04 PROBLEM — R26.2 AMBULATORY DYSFUNCTION: Status: ACTIVE | Noted: 2023-09-04

## 2023-09-04 PROBLEM — J44.9 CHRONIC OBSTRUCTIVE PULMONARY DISEASE (MULTI): Status: ACTIVE | Noted: 2023-08-16

## 2023-09-04 PROBLEM — I10 HYPERTENSION: Status: ACTIVE | Noted: 2023-09-04

## 2023-09-04 PROBLEM — U07.1 COVID-19: Status: RESOLVED | Noted: 2023-08-24 | Resolved: 2023-09-04

## 2023-09-04 PROBLEM — R78.81 BACTEREMIA: Status: RESOLVED | Noted: 2023-06-14 | Resolved: 2023-09-04

## 2023-09-04 PROBLEM — R65.20: Status: RESOLVED | Noted: 2023-06-14 | Resolved: 2023-09-04

## 2023-09-04 PROBLEM — I33.0 SUBACUTE BACTERIAL ENDOCARDITIS (HHS-HCC): Status: RESOLVED | Noted: 2023-06-14 | Resolved: 2023-09-04

## 2023-09-04 PROBLEM — I26.99 OTHER PULMONARY EMBOLISM WITHOUT ACUTE COR PULMONALE (MULTI): Status: RESOLVED | Noted: 2023-07-17 | Resolved: 2023-09-04

## 2023-09-04 PROBLEM — R53.81 PHYSICAL DEBILITY: Status: ACTIVE | Noted: 2023-09-04

## 2023-09-04 PROBLEM — J96.11 CHRONIC RESPIRATORY FAILURE WITH HYPOXIA (MULTI): Status: RESOLVED | Noted: 2023-07-20 | Resolved: 2023-09-04

## 2023-09-04 PROBLEM — Z79.01 CURRENT USE OF LONG TERM ANTICOAGULATION: Status: ACTIVE | Noted: 2023-09-04

## 2023-09-04 NOTE — PROGRESS NOTES
Documentation at [facility]     Problem List Items Addressed This Visit       Persistent atrial fibrillation (CMS/HCC)    Stage 3a chronic kidney disease (CMS/HCC)    Chronic obstructive pulmonary disease (CMS/HCC)    Clostridioides difficile diarrhea - Primary    Heart murmur    Physical debility    Ambulatory dysfunction    Hypertension    Hyperlipidemia    Current use of long term anticoagulation     Fell from ladder at home in June 2023.  No fracture.  Had been driving, living independently prior to the fall.  Within a month was hospitalized with sepsis, PE  Due to prolonged hospitalization became very weak.  Rehab at [place]  Developed covid in August 2023, relapsed in rehab  Developed C diff colitis after covid.  Will complete vancomycin 9/4/23    Now unable to ambulate.  Requires Amy lift.    Cardiologist is Dr. Oh Agustin - has follow up appointment in near future

## 2024-07-18 NOTE — PROGRESS NOTES
Subjective   Patient ID: Dustin Draper is a 78 y.o. male.    Pt seen and examined, states overall doing well without any complaints. Is getting up in chair and feels like his strength is improving. Oxygen is overall stable. He regards no additional issues or concerns at this time.        Review of Systems   Constitutional: Negative.    HENT: Negative.     Respiratory: Negative.     Cardiovascular: Negative.    Gastrointestinal: Negative.    Musculoskeletal: Negative.    Skin: Negative.    Neurological: Negative.    Psychiatric/Behavioral: Negative.         Objective Vitals Reviewed via facility EMR   Physical Exam  Vitals and nursing note reviewed.   Constitutional:       General: He is not in acute distress.     Appearance: Normal appearance.   HENT:      Mouth/Throat:      Mouth: Mucous membranes are moist.      Pharynx: Oropharynx is clear. No oropharyngeal exudate.   Eyes:      Extraocular Movements: Extraocular movements intact.      Pupils: Pupils are equal, round, and reactive to light.   Cardiovascular:      Rate and Rhythm: Normal rate and regular rhythm.      Pulses: Normal pulses.      Heart sounds: Normal heart sounds. No murmur heard.  Pulmonary:      Effort: Pulmonary effort is normal. No respiratory distress.      Comments: On chronic O2  Abdominal:      General: Abdomen is flat. Bowel sounds are normal. There is no distension.      Tenderness: There is no abdominal tenderness.      Comments: obese   Musculoskeletal:         General: Normal range of motion.      Right lower leg: No edema.      Left lower leg: No edema.   Skin:     General: Skin is warm and dry.      Capillary Refill: Capillary refill takes less than 2 seconds.   Neurological:      General: No focal deficit present.      Mental Status: He is alert. Mental status is at baseline.      Cranial Nerves: No cranial nerve deficit.      Motor: No weakness.   Psychiatric:         Mood and Affect: Mood normal.         Thought Content: Thought  content normal.         Assessment/Plan   Diagnoses and all orders for this visit:  Persistent atrial fibrillation (CMS/HCC)  Other pulmonary embolism without acute cor pulmonale, unspecified chronicity (CMS/HCC)  Stage 3a chronic kidney disease  Fall, initial encounter  Acute on chronic respiratory failure with hypoxia (CMS/HCC)  Chronic respiratory failure with hypoxia (CMS/HCC)    Pt seen and examined, overall feeling well, labs overall stable, continue supportive care, pt/ot, routine labs, no changes to medications       Statement Selected

## 2024-10-25 ENCOUNTER — NURSING HOME VISIT (OUTPATIENT)
Dept: POST ACUTE CARE | Facility: EXTERNAL LOCATION | Age: 79
End: 2024-10-25
Payer: MEDICARE

## 2024-10-25 DIAGNOSIS — E78.5 HYPERLIPIDEMIA, UNSPECIFIED HYPERLIPIDEMIA TYPE: ICD-10-CM

## 2024-10-25 DIAGNOSIS — I13.0 HYPERTENSIVE HEART AND CHRONIC KIDNEY DISEASE WITH HEART FAILURE AND STAGE 1 THROUGH STAGE 4 CHRONIC KIDNEY DISEASE, OR UNSPECIFIED CHRONIC KIDNEY DISEASE: ICD-10-CM

## 2024-10-25 DIAGNOSIS — E44.0 MODERATE PROTEIN-CALORIE MALNUTRITION (MULTI): ICD-10-CM

## 2024-10-25 DIAGNOSIS — I48.91 ATRIAL FIBRILLATION, UNSPECIFIED TYPE (MULTI): ICD-10-CM

## 2024-10-25 DIAGNOSIS — G30.1 MODERATE LATE ONSET ALZHEIMER'S DEMENTIA WITHOUT BEHAVIORAL DISTURBANCE, PSYCHOTIC DISTURBANCE, MOOD DISTURBANCE, OR ANXIETY (MULTI): Primary | ICD-10-CM

## 2024-10-25 DIAGNOSIS — J44.9 CHRONIC OBSTRUCTIVE PULMONARY DISEASE, UNSPECIFIED COPD TYPE (MULTI): ICD-10-CM

## 2024-10-25 DIAGNOSIS — R01.1 HEART MURMUR: ICD-10-CM

## 2024-10-25 DIAGNOSIS — F02.B0 MODERATE LATE ONSET ALZHEIMER'S DEMENTIA WITHOUT BEHAVIORAL DISTURBANCE, PSYCHOTIC DISTURBANCE, MOOD DISTURBANCE, OR ANXIETY (MULTI): Primary | ICD-10-CM

## 2024-10-25 DIAGNOSIS — Z79.01 CURRENT USE OF LONG TERM ANTICOAGULATION: ICD-10-CM

## 2024-10-25 DIAGNOSIS — N18.31 STAGE 3A CHRONIC KIDNEY DISEASE (MULTI): ICD-10-CM

## 2024-10-25 DIAGNOSIS — E11.22 TYPE 2 DIABETES MELLITUS WITH STAGE 3A CHRONIC KIDNEY DISEASE, WITHOUT LONG-TERM CURRENT USE OF INSULIN (MULTI): ICD-10-CM

## 2024-10-25 DIAGNOSIS — Z99.81 SUPPLEMENTAL OXYGEN DEPENDENT: ICD-10-CM

## 2024-10-25 DIAGNOSIS — N18.31 TYPE 2 DIABETES MELLITUS WITH STAGE 3A CHRONIC KIDNEY DISEASE, WITHOUT LONG-TERM CURRENT USE OF INSULIN (MULTI): ICD-10-CM

## 2024-10-25 DIAGNOSIS — I50.31 ACUTE DIASTOLIC (CONGESTIVE) HEART FAILURE: ICD-10-CM

## 2024-10-25 PROCEDURE — 99349 HOME/RES VST EST MOD MDM 40: CPT | Performed by: FAMILY MEDICINE

## 2024-10-30 VITALS
SYSTOLIC BLOOD PRESSURE: 150 MMHG | WEIGHT: 240 LBS | HEIGHT: 70 IN | TEMPERATURE: 97.3 F | DIASTOLIC BLOOD PRESSURE: 89 MMHG | RESPIRATION RATE: 18 BRPM | HEART RATE: 60 BPM | BODY MASS INDEX: 34.36 KG/M2

## 2024-10-30 PROBLEM — E44.0 MODERATE PROTEIN-CALORIE MALNUTRITION (MULTI): Status: ACTIVE | Noted: 2024-10-30

## 2024-10-30 PROBLEM — N18.31 TYPE 2 DIABETES MELLITUS WITH STAGE 3A CHRONIC KIDNEY DISEASE, WITHOUT LONG-TERM CURRENT USE OF INSULIN (MULTI): Status: ACTIVE | Noted: 2024-10-30

## 2024-10-30 PROBLEM — G30.1 MODERATE LATE ONSET ALZHEIMER'S DEMENTIA WITHOUT BEHAVIORAL DISTURBANCE, PSYCHOTIC DISTURBANCE, MOOD DISTURBANCE, OR ANXIETY (MULTI): Status: ACTIVE | Noted: 2024-10-30

## 2024-10-30 PROBLEM — I50.31 ACUTE DIASTOLIC (CONGESTIVE) HEART FAILURE: Status: ACTIVE | Noted: 2024-10-30

## 2024-10-30 PROBLEM — I13.0 HYPERTENSIVE HEART AND CHRONIC KIDNEY DISEASE WITH HEART FAILURE AND STAGE 1 THROUGH STAGE 4 CHRONIC KIDNEY DISEASE, OR UNSPECIFIED CHRONIC KIDNEY DISEASE: Status: ACTIVE | Noted: 2024-10-30

## 2024-10-30 PROBLEM — E11.22 TYPE 2 DIABETES MELLITUS WITH STAGE 3A CHRONIC KIDNEY DISEASE, WITHOUT LONG-TERM CURRENT USE OF INSULIN (MULTI): Status: ACTIVE | Noted: 2024-10-30

## 2024-10-30 PROBLEM — F02.B0 MODERATE LATE ONSET ALZHEIMER'S DEMENTIA WITHOUT BEHAVIORAL DISTURBANCE, PSYCHOTIC DISTURBANCE, MOOD DISTURBANCE, OR ANXIETY (MULTI): Status: ACTIVE | Noted: 2024-10-30

## 2024-11-17 ENCOUNTER — NURSING HOME VISIT (OUTPATIENT)
Dept: POST ACUTE CARE | Facility: EXTERNAL LOCATION | Age: 79
End: 2024-11-17
Payer: MEDICARE

## 2024-11-17 DIAGNOSIS — E78.5 HYPERLIPIDEMIA, UNSPECIFIED HYPERLIPIDEMIA TYPE: ICD-10-CM

## 2024-11-17 DIAGNOSIS — N18.31 TYPE 2 DIABETES MELLITUS WITH STAGE 3A CHRONIC KIDNEY DISEASE, WITHOUT LONG-TERM CURRENT USE OF INSULIN (MULTI): ICD-10-CM

## 2024-11-17 DIAGNOSIS — E03.9 HYPOTHYROIDISM, UNSPECIFIED TYPE: ICD-10-CM

## 2024-11-17 DIAGNOSIS — E66.01 CLASS 2 SEVERE OBESITY DUE TO EXCESS CALORIES WITH SERIOUS COMORBIDITY AND BODY MASS INDEX (BMI) OF 35.0 TO 35.9 IN ADULT: ICD-10-CM

## 2024-11-17 DIAGNOSIS — J44.9 CHRONIC OBSTRUCTIVE PULMONARY DISEASE, UNSPECIFIED COPD TYPE (MULTI): ICD-10-CM

## 2024-11-17 DIAGNOSIS — G30.1 MODERATE LATE ONSET ALZHEIMER'S DEMENTIA WITHOUT BEHAVIORAL DISTURBANCE, PSYCHOTIC DISTURBANCE, MOOD DISTURBANCE, OR ANXIETY (MULTI): ICD-10-CM

## 2024-11-17 DIAGNOSIS — R26.2 AMBULATORY DYSFUNCTION: ICD-10-CM

## 2024-11-17 DIAGNOSIS — R97.20 ABNORMAL PROSTATE SPECIFIC ANTIGEN (PSA): Primary | ICD-10-CM

## 2024-11-17 DIAGNOSIS — R53.81 PHYSICAL DEBILITY: ICD-10-CM

## 2024-11-17 DIAGNOSIS — E66.812 CLASS 2 SEVERE OBESITY DUE TO EXCESS CALORIES WITH SERIOUS COMORBIDITY AND BODY MASS INDEX (BMI) OF 35.0 TO 35.9 IN ADULT: ICD-10-CM

## 2024-11-17 DIAGNOSIS — I26.99 OTHER PULMONARY EMBOLISM WITHOUT ACUTE COR PULMONALE, UNSPECIFIED CHRONICITY (MULTI): ICD-10-CM

## 2024-11-17 DIAGNOSIS — I10 HYPERTENSION, ESSENTIAL: ICD-10-CM

## 2024-11-17 DIAGNOSIS — F02.B0 MODERATE LATE ONSET ALZHEIMER'S DEMENTIA WITHOUT BEHAVIORAL DISTURBANCE, PSYCHOTIC DISTURBANCE, MOOD DISTURBANCE, OR ANXIETY (MULTI): ICD-10-CM

## 2024-11-17 DIAGNOSIS — E11.22 TYPE 2 DIABETES MELLITUS WITH STAGE 3A CHRONIC KIDNEY DISEASE, WITHOUT LONG-TERM CURRENT USE OF INSULIN (MULTI): ICD-10-CM

## 2024-11-17 DIAGNOSIS — I48.91 ATRIAL FIBRILLATION, UNSPECIFIED TYPE (MULTI): ICD-10-CM

## 2024-11-17 DIAGNOSIS — N18.31 STAGE 3A CHRONIC KIDNEY DISEASE (MULTI): ICD-10-CM

## 2024-11-17 DIAGNOSIS — R01.1 HEART MURMUR: ICD-10-CM

## 2024-11-17 PROCEDURE — 99306 1ST NF CARE HIGH MDM 50: CPT | Performed by: FAMILY MEDICINE

## 2024-11-17 NOTE — LETTER
Patient: Dustin Draper  : 1945    Encounter Date: 2024    Admission H&P to [facility]    79 y.o. male admitted to [facility] on 10/30/24.  Transferred from [facility] due to physical decline requiring additional assistance with ADL.    HPI  Refer to [facility] documentation.  Remains passive regarding ambulation  Last HgA1C 6.1% on 10/29/24  DNR CCA    Has a pronounced heart murmur.  Echocardiolgram 24 in chart      Past Medical History:   Diagnosis Date   • Abnormal prostate specific antigen (PSA) 2024   • Acute diastolic (congestive) heart failure 10/30/2024   • Acute on chronic respiratory failure with hypoxia ([facility]) 2023   • Ambulatory dysfunction 2023   • Atrial fibrillation (Multi) 2023   • Chronic obstructive pulmonary disease (Multi) 2023   • Clostridioides difficile diarrhea 2023   • COVID-19 2023   • Current use of long term anticoagulation 2023    Eliquis     • Fall 2023   • Gastrointestinal hemorrhage with melena 2023   • Heart murmur 2023   • Moderate protein-calorie malnutrition (Multi) 10/30/2024   • Other pulmonary embolism without acute cor pulmonale 2023   • Sepsis due to Streptococcus species with acute organ dysfunction (Multi) 2023   • Subacute bacterial endocarditis (HHS-HCC) 2023      Past Surgical History:   Procedure Laterality Date   • OTHER SURGICAL HISTORY  2019    No history of surgery     No family history on file.   Social History     Socioeconomic History   • Marital status:      Spouse name: Not on file   • Number of children: Not on file   • Years of education: Not on file   • Highest education level: Not on file   Occupational History   • Not on file   Tobacco Use   • Smoking status: Never   • Smokeless tobacco: Never   Substance and Sexual Activity   • Alcohol use: Not Currently   • Drug use: Not Currently   • Sexual activity: Not on file   Other Topics Concern   • Not on  "file   Social History Narrative   • Not on file     Social Drivers of Health     Financial Resource Strain: Not on file   Food Insecurity: Not on file   Transportation Needs: Not on file   Physical Activity: Not on file   Stress: Not on file   Social Connections: Not on file   Intimate Partner Violence: Not on file   Housing Stability: Not on file       Current Outpatient Medications on File Prior to Visit   Medication Sig Dispense Refill   • amLODIPine (Norvasc) 10 mg tablet TAKE 1 TABLET BY MOUTH EVERY DAY 90 tablet 1   • furosemide (Lasix) 20 mg tablet TAKE 1 TABLET BY MOUTH EVERY OTHER DAY 45 tablet 0   • hydroCHLOROthiazide (HYDRODiuril) 12.5 mg tablet TAKE 1 TABLET BY MOUTH EVERY DAY 90 tablet 1   • Jardiance 10 mg TAKE 1 TABLET BY MOUTH EVERY DAY 30 tablet 3   • levothyroxine (Synthroid, Levoxyl) 137 mcg tablet TAKE 1 TABLET BY MOUTH EVERY DAY 90 tablet 3   • losartan (Cozaar) 100 mg tablet Take 1 tablet (100 mg) by mouth once daily. 90 tablet 0   • metoprolol succinate XL (Toprol-XL) 50 mg 24 hr tablet TAKE 1 TABLET BY MOUTH EVERY DAY 90 tablet 1   • potassium chloride CR 10 mEq ER tablet TAKE 1 TABLET BY MOUTH EVERY DAY 90 tablet 1   • rosuvastatin (Crestor) 10 mg tablet TAKE 1 TABLET BY MOUTH EVERY DAY 90 tablet 1   • spironolactone (Aldactone) 25 mg tablet TAKE 1 TABLET BY MOUTH EVERY DAY 30 tablet 0   • spironolactone (Aldactone) 25 mg tablet Take 1 tablet (25 mg) by mouth once daily.       No current facility-administered medications on file prior to visit.       Not on File      ROS: Denies chest pain, SOB, Headache, GI problems     Visit Vitals  /74   Pulse 62   Temp 36.9 °C (98.4 °F)   Ht 1.778 m (5' 10\")   Wt 112 kg (246 lb 12.8 oz)   BMI 35.41 kg/m²   Smoking Status Never   BSA 2.35 m²      Vitals:    11/19/24 2201 11/19/24 2202 11/19/24 2203   BP: (!) 169/93 153/74 132/74   Pulse: 62     Temp: 36.9 °C (98.4 °F)     Weight: 112 kg (246 lb 12.8 oz)     Height: 1.778 m (5' 10\")         PHYSICAL " EXAM:  Alert and oriented to himself but not location/time/date  Eyes: EOM grossly intact  Neck supple without lymph adenopathy or carotid bruit.  No masses or thyromegaly  Heart regular rate and rhythm without murmur.  Lungs clear to auscultation.  Abdomen obese, soft.  Hypoactive BS  Legs without edema. Feet with hyperkeratotic skin and dystrophic thickened toe nails  Speech clear.  Hearing adequate.      In-chart Medication and lab reviewed.      DIAGNOSIS/PLAN:  Problem List Items Addressed This Visit       Atrial fibrillation (Multi)    Stage 3a chronic kidney disease (Multi)    RESOLVED: Other pulmonary embolism without acute cor pulmonale    Chronic obstructive pulmonary disease (Multi)    Heart murmur    Physical debility    Ambulatory dysfunction    Hypertension, essential    Hyperlipidemia    Moderate late onset Alzheimer's dementia without behavioral disturbance, psychotic disturbance, mood disturbance, or anxiety (Multi)    Type 2 diabetes mellitus with stage 3a chronic kidney disease, without long-term current use of insulin (Multi)    Abnormal prostate specific antigen (PSA) - Primary    Hypothyroidism    Class 2 severe obesity due to excess calories with serious comorbidity and body mass index (BMI) of 35.0 to 35.9 in adult     Decrease Synthroid dose slightly, recheck in 6 wks  Faxed [facility] documents.  No records from [facility] were sent to LTC unit  nursing home care.    No physical reason for lack of ambulation.  Suspect Alzheimer or vascular dementia is etiology.        Theodore Cervantes DO, JENAOFP        Electronically Signed By: Theodore Cervantes DO   11/19/24 10:20 PM

## 2024-11-19 VITALS
HEART RATE: 62 BPM | TEMPERATURE: 98.4 F | BODY MASS INDEX: 35.33 KG/M2 | DIASTOLIC BLOOD PRESSURE: 74 MMHG | HEIGHT: 70 IN | WEIGHT: 246.8 LBS | SYSTOLIC BLOOD PRESSURE: 132 MMHG

## 2024-11-19 PROBLEM — I48.91 ATRIAL FIBRILLATION (MULTI): Status: ACTIVE | Noted: 2023-06-14

## 2024-11-19 PROBLEM — E66.01 CLASS 2 SEVERE OBESITY DUE TO EXCESS CALORIES WITH SERIOUS COMORBIDITY AND BODY MASS INDEX (BMI) OF 35.0 TO 35.9 IN ADULT: Status: ACTIVE | Noted: 2024-11-19

## 2024-11-19 PROBLEM — E66.812 CLASS 2 SEVERE OBESITY DUE TO EXCESS CALORIES WITH SERIOUS COMORBIDITY AND BODY MASS INDEX (BMI) OF 35.0 TO 35.9 IN ADULT: Status: ACTIVE | Noted: 2024-11-19

## 2024-11-19 PROBLEM — E03.9 HYPOTHYROIDISM: Status: ACTIVE | Noted: 2024-11-19

## 2024-11-19 PROBLEM — R97.20 ABNORMAL PROSTATE SPECIFIC ANTIGEN (PSA): Status: ACTIVE | Noted: 2024-11-19

## 2024-11-19 PROBLEM — E44.0 MODERATE PROTEIN-CALORIE MALNUTRITION (MULTI): Status: RESOLVED | Noted: 2024-10-30 | Resolved: 2024-11-19

## 2024-11-20 NOTE — PROGRESS NOTES
Admission H&P to [facility]    79 y.o. male admitted to [facility] on 10/30/24.  Transferred from [facility] due to physical decline requiring additional assistance with ADL.    HPI  Refer to [facility] documentation.  Remains passive regarding ambulation  Last HgA1C 6.1% on 10/29/24  DNR CCA    Has a pronounced heart murmur.  Echocardiolgram 11/1/24 in chart      Past Medical History:   Diagnosis Date    Abnormal prostate specific antigen (PSA) 11/19/2024    Acute diastolic (congestive) heart failure 10/30/2024    Acute on chronic respiratory failure with hypoxia (Multi) 06/28/2023    Ambulatory dysfunction 09/04/2023    Atrial fibrillation (Multi) 06/14/2023    Chronic obstructive pulmonary disease (Multi) 08/16/2023    Clostridioides difficile diarrhea 08/24/2023    COVID-19 08/24/2023    Current use of long term anticoagulation 09/04/2023    Eliquis      Fall 06/14/2023    Gastrointestinal hemorrhage with melena 06/14/2023    Heart murmur 09/04/2023    Moderate protein-calorie malnutrition (Multi) 10/30/2024    Other pulmonary embolism without acute cor pulmonale 07/17/2023    Sepsis due to Streptococcus species with acute organ dysfunction (Multi) 06/14/2023    Subacute bacterial endocarditis (HHS-HCC) 06/14/2023      Past Surgical History:   Procedure Laterality Date    OTHER SURGICAL HISTORY  09/06/2019    No history of surgery     No family history on file.   Social History     Socioeconomic History    Marital status:      Spouse name: Not on file    Number of children: Not on file    Years of education: Not on file    Highest education level: Not on file   Occupational History    Not on file   Tobacco Use    Smoking status: Never    Smokeless tobacco: Never   Substance and Sexual Activity    Alcohol use: Not Currently    Drug use: Not Currently    Sexual activity: Not on file   Other Topics Concern    Not on file   Social History Narrative    Not on file     Social Drivers of Health     Financial Resource  "Strain: Not on file   Food Insecurity: Not on file   Transportation Needs: Not on file   Physical Activity: Not on file   Stress: Not on file   Social Connections: Not on file   Intimate Partner Violence: Not on file   Housing Stability: Not on file       Current Outpatient Medications on File Prior to Visit   Medication Sig Dispense Refill    amLODIPine (Norvasc) 10 mg tablet TAKE 1 TABLET BY MOUTH EVERY DAY 90 tablet 1    furosemide (Lasix) 20 mg tablet TAKE 1 TABLET BY MOUTH EVERY OTHER DAY 45 tablet 0    hydroCHLOROthiazide (HYDRODiuril) 12.5 mg tablet TAKE 1 TABLET BY MOUTH EVERY DAY 90 tablet 1    Jardiance 10 mg TAKE 1 TABLET BY MOUTH EVERY DAY 30 tablet 3    levothyroxine (Synthroid, Levoxyl) 137 mcg tablet TAKE 1 TABLET BY MOUTH EVERY DAY 90 tablet 3    losartan (Cozaar) 100 mg tablet Take 1 tablet (100 mg) by mouth once daily. 90 tablet 0    metoprolol succinate XL (Toprol-XL) 50 mg 24 hr tablet TAKE 1 TABLET BY MOUTH EVERY DAY 90 tablet 1    potassium chloride CR 10 mEq ER tablet TAKE 1 TABLET BY MOUTH EVERY DAY 90 tablet 1    rosuvastatin (Crestor) 10 mg tablet TAKE 1 TABLET BY MOUTH EVERY DAY 90 tablet 1    spironolactone (Aldactone) 25 mg tablet TAKE 1 TABLET BY MOUTH EVERY DAY 30 tablet 0    spironolactone (Aldactone) 25 mg tablet Take 1 tablet (25 mg) by mouth once daily.       No current facility-administered medications on file prior to visit.       Not on File      ROS: Denies chest pain, SOB, Headache, GI problems     Visit Vitals  /74   Pulse 62   Temp 36.9 °C (98.4 °F)   Ht 1.778 m (5' 10\")   Wt 112 kg (246 lb 12.8 oz)   BMI 35.41 kg/m²   Smoking Status Never   BSA 2.35 m²      Vitals:    11/19/24 2201 11/19/24 2202 11/19/24 2203   BP: (!) 169/93 153/74 132/74   Pulse: 62     Temp: 36.9 °C (98.4 °F)     Weight: 112 kg (246 lb 12.8 oz)     Height: 1.778 m (5' 10\")         PHYSICAL EXAM:  Alert and oriented to himself but not location/time/date  Eyes: EOM grossly intact  Neck supple without " lymph adenopathy or carotid bruit.  No masses or thyromegaly  Heart regular rate and rhythm without murmur.  Lungs clear to auscultation.  Abdomen obese, soft.  Hypoactive BS  Legs without edema. Feet with hyperkeratotic skin and dystrophic thickened toe nails  Speech clear.  Hearing adequate.      In-chart Medication and lab reviewed.      DIAGNOSIS/PLAN:  Problem List Items Addressed This Visit       Atrial fibrillation (Multi)    Stage 3a chronic kidney disease (Multi)    RESOLVED: Other pulmonary embolism without acute cor pulmonale    Chronic obstructive pulmonary disease (Multi)    Heart murmur    Physical debility    Ambulatory dysfunction    Hypertension, essential    Hyperlipidemia    Moderate late onset Alzheimer's dementia without behavioral disturbance, psychotic disturbance, mood disturbance, or anxiety (Multi)    Type 2 diabetes mellitus with stage 3a chronic kidney disease, without long-term current use of insulin (Multi)    Abnormal prostate specific antigen (PSA) - Primary    Hypothyroidism    Class 2 severe obesity due to excess calories with serious comorbidity and body mass index (BMI) of 35.0 to 35.9 in adult     Decrease Synthroid dose slightly, recheck in 6 wks  Faxed [facility] documents.  No records from [facility] were sent to LTC unit  correction care.    No physical reason for lack of ambulation.  Suspect Alzheimer or vascular dementia is etiology.        Theodore Cervantes DO, FACLETI

## 2025-04-22 ENCOUNTER — TELEPHONE (OUTPATIENT)
Dept: PRIMARY CARE | Facility: CLINIC | Age: 80
End: 2025-04-22
Payer: MEDICARE